# Patient Record
Sex: MALE | Race: WHITE | NOT HISPANIC OR LATINO | URBAN - NONMETROPOLITAN AREA
[De-identification: names, ages, dates, MRNs, and addresses within clinical notes are randomized per-mention and may not be internally consistent; named-entity substitution may affect disease eponyms.]

---

## 2018-02-14 ENCOUNTER — OFFICE VISIT CONVERTED (OUTPATIENT)
Dept: FAMILY MEDICINE CLINIC | Age: 38
End: 2018-02-14
Attending: NURSE PRACTITIONER

## 2018-02-17 LAB
ALBUMIN SERPL-MCNC: 4.7 G/DL
ALBUMIN/GLOB SERPL: 2 {RATIO}
ALP SERPL-CCNC: 82 IU/L
ALT SERPL-CCNC: 82 IU/L
AST SERPL-CCNC: 45 IU/L
BILIRUB SERPL-MCNC: 0.3 MG/DL
BUN SERPL-MCNC: 14 MG/DL
BUN/CREAT SERPL: 12
CALCIUM SERPL-MCNC: 9.3 MG/DL
CHLORIDE SERPL-SCNC: 104 MMOL/L
CHOLEST SERPL-MCNC: 142 MG/DL
CO2 SERPL-SCNC: 24 MMOL/L
CONV TOTAL PROTEIN: 7.1 G/DL
CREAT UR-MCNC: 1.18 MG/DL
ERYTHROCYTE [DISTWIDTH] IN BLOOD BY AUTOMATED COUNT: 13.6 %
GLOBULIN UR ELPH-MCNC: 2.4 G/DL
GLUCOSE SERPL-MCNC: 98 MG/DL
HCT VFR BLD AUTO: 42.5 %
HDLC SERPL-MCNC: 34 MG/DL
HGB BLD-MCNC: 14.6 G/DL
LDLC SERPL CALC-MCNC: 89 MG/DL
MCH RBC QN AUTO: 30.2 PG
MCHC RBC AUTO-ENTMCNC: 34.4 G/DL
MCV RBC AUTO: 88 FL
PLATELET # BLD AUTO: 285 X10E3/UL
POTASSIUM SERPL-SCNC: 4.2 MMOL/L
RBC # BLD AUTO: 4.84 X10E6/UL
SODIUM SERPL-SCNC: 145 MMOL/L
TRIGL SERPL-MCNC: 95 MG/DL
TSH SERPL-ACNC: 1.91 UIU/ML
VLDLC SERPL-MCNC: 19 MG/DL
WBC # BLD AUTO: 7.4 X10E3/UL

## 2018-11-26 ENCOUNTER — OFFICE VISIT CONVERTED (OUTPATIENT)
Dept: FAMILY MEDICINE CLINIC | Age: 38
End: 2018-11-26
Attending: NURSE PRACTITIONER

## 2018-11-30 ENCOUNTER — OFFICE VISIT CONVERTED (OUTPATIENT)
Dept: FAMILY MEDICINE CLINIC | Age: 38
End: 2018-11-30
Attending: NURSE PRACTITIONER

## 2019-01-10 ENCOUNTER — OFFICE VISIT CONVERTED (OUTPATIENT)
Dept: FAMILY MEDICINE CLINIC | Age: 39
End: 2019-01-10
Attending: FAMILY MEDICINE

## 2019-01-11 ENCOUNTER — CONVERSION ENCOUNTER (OUTPATIENT)
Dept: CARDIOLOGY | Facility: CLINIC | Age: 39
End: 2019-01-11

## 2019-01-11 ENCOUNTER — CONVERSION ENCOUNTER (OUTPATIENT)
Dept: CARDIOLOGY | Facility: CLINIC | Age: 39
End: 2019-01-11
Attending: INTERNAL MEDICINE

## 2019-02-13 ENCOUNTER — OFFICE VISIT CONVERTED (OUTPATIENT)
Dept: FAMILY MEDICINE CLINIC | Age: 39
End: 2019-02-13
Attending: FAMILY MEDICINE

## 2019-02-13 ENCOUNTER — HOSPITAL ENCOUNTER (OUTPATIENT)
Dept: OTHER | Facility: HOSPITAL | Age: 39
Discharge: HOME | End: 2019-02-13
Attending: FAMILY MEDICINE

## 2019-02-13 LAB
ALBUMIN SERPL-MCNC: 4.7 G/DL (ref 3.5–5)
ALBUMIN/GLOB SERPL: 1.7 {RATIO} (ref 1.4–2.6)
ALP SERPL-CCNC: 100 U/L (ref 53–128)
ALT SERPL-CCNC: 49 U/L (ref 10–40)
ANION GAP SERPL CALC-SCNC: 15 MMOL/L (ref 8–19)
AST SERPL-CCNC: 25 U/L (ref 15–50)
BASOPHILS # BLD MANUAL: 0.03 10*3/UL (ref 0–0.2)
BASOPHILS NFR BLD MANUAL: 0.3 % (ref 0–3)
BILIRUB SERPL-MCNC: 0.55 MG/DL (ref 0.2–1.3)
BUN SERPL-MCNC: 20 MG/DL (ref 5–25)
BUN/CREAT SERPL: 18 {RATIO} (ref 6–20)
CALCIUM SERPL-MCNC: 9.7 MG/DL (ref 8.7–10.4)
CHLORIDE SERPL-SCNC: 103 MMOL/L (ref 99–111)
CHOLEST SERPL-MCNC: 132 MG/DL (ref 107–200)
CHOLEST/HDLC SERPL: 3.8 {RATIO} (ref 3–6)
CONV CO2: 28 MMOL/L (ref 22–32)
CONV TOTAL PROTEIN: 7.5 G/DL (ref 6.3–8.2)
CREAT UR-MCNC: 1.11 MG/DL (ref 0.7–1.2)
DEPRECATED RDW RBC AUTO: 42.3 FL
EOSINOPHIL # BLD MANUAL: 0.11 10*3/UL (ref 0–0.7)
EOSINOPHIL NFR BLD MANUAL: 1.2 % (ref 0–7)
ERYTHROCYTE [DISTWIDTH] IN BLOOD BY AUTOMATED COUNT: 12.6 % (ref 11.5–14.5)
GFR SERPLBLD BASED ON 1.73 SQ M-ARVRAT: >60 ML/MIN/{1.73_M2}
GLOBULIN UR ELPH-MCNC: 2.8 G/DL (ref 2–3.5)
GLUCOSE SERPL-MCNC: 96 MG/DL (ref 70–99)
GRANS (ABSOLUTE): 6.6 10*3/UL (ref 2–8)
GRANS: 69 % (ref 30–85)
HBA1C MFR BLD: 14.8 G/DL (ref 14–18)
HCT VFR BLD AUTO: 44.2 % (ref 42–52)
HDLC SERPL-MCNC: 35 MG/DL (ref 40–60)
IMM GRANULOCYTES # BLD: 0.02 10*3/UL (ref 0–0.54)
IMM GRANULOCYTES NFR BLD: 0.2 % (ref 0–0.43)
LDLC SERPL CALC-MCNC: 82 MG/DL (ref 70–100)
LYMPHOCYTES # BLD MANUAL: 2.24 10*3/UL (ref 1–5)
LYMPHOCYTES NFR BLD MANUAL: 5.9 % (ref 3–10)
MCH RBC QN AUTO: 30.5 PG (ref 27–31)
MCHC RBC AUTO-ENTMCNC: 33.5 G/DL (ref 33–37)
MCV RBC AUTO: 91.1 FL (ref 80–96)
MONOCYTES # BLD AUTO: 0.56 10*3/UL (ref 0.2–1.2)
OSMOLALITY SERPL CALC.SUM OF ELEC: 296 MOSM/KG (ref 273–304)
PLATELET # BLD AUTO: 292 10*3/UL (ref 130–400)
PMV BLD AUTO: 10 FL (ref 7.4–10.4)
POTASSIUM SERPL-SCNC: 4.3 MMOL/L (ref 3.5–5.3)
RBC # BLD AUTO: 4.85 10*6/UL (ref 4.7–6.1)
SODIUM SERPL-SCNC: 142 MMOL/L (ref 135–147)
TRIGL SERPL-MCNC: 77 MG/DL (ref 40–150)
TSH SERPL-ACNC: 1.89 M[IU]/L (ref 0.27–4.2)
VARIANT LYMPHS NFR BLD MANUAL: 23.4 % (ref 20–45)
VLDLC SERPL-MCNC: 15 MG/DL (ref 5–37)
WBC # BLD AUTO: 9.56 10*3/UL (ref 4.8–10.8)

## 2019-11-21 ENCOUNTER — HOSPITAL ENCOUNTER (OUTPATIENT)
Dept: OTHER | Facility: HOSPITAL | Age: 39
Discharge: HOME | End: 2019-11-21
Attending: FAMILY MEDICINE

## 2019-11-21 LAB
CHOLEST SERPL-MCNC: 129 MG/DL (ref 107–200)
CHOLEST/HDLC SERPL: 3.2 {RATIO} (ref 3–6)
GLUCOSE SERPL-MCNC: 98 MG/DL (ref 70–110)
HDLC SERPL-MCNC: 40 MG/DL (ref 40–60)
LDLC SERPL CALC-MCNC: 75 MG/DL (ref 70–100)
TRIGL SERPL-MCNC: 71 MG/DL (ref 40–150)
VLDLC SERPL-MCNC: 14 MG/DL (ref 5–37)

## 2020-02-12 ENCOUNTER — OFFICE VISIT CONVERTED (OUTPATIENT)
Dept: FAMILY MEDICINE CLINIC | Age: 40
End: 2020-02-12
Attending: FAMILY MEDICINE

## 2021-02-03 ENCOUNTER — HOSPITAL ENCOUNTER (OUTPATIENT)
Dept: OTHER | Facility: HOSPITAL | Age: 41
Discharge: HOME | End: 2021-02-03
Attending: FAMILY MEDICINE

## 2021-02-04 LAB — SARS-COV-2 RNA SPEC QL NAA+PROBE: NOT DETECTED

## 2021-05-16 VITALS
DIASTOLIC BLOOD PRESSURE: 75 MMHG | HEIGHT: 74 IN | SYSTOLIC BLOOD PRESSURE: 115 MMHG | HEART RATE: 62 BPM | BODY MASS INDEX: 30.03 KG/M2 | WEIGHT: 234 LBS

## 2021-05-18 NOTE — PROGRESS NOTES
Bradley AmaroYe 1980     Office/Outpatient Visit    Visit Date: Fri, Nov 30, 2018 03:38 pm    Provider: Corina Engel N.P. (Assistant: Barbara Tenorio LPN)    Location: [facility]        Electronically signed by Corina Engel N.P. on  11/30/2018 10:06:36 PM                             SUBJECTIVE:        CC:     Mr. Amaro is a 38 year old White male.  [facility] er yesterday due to chest pain. Told to f/u at this office.          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 He went to ER where he reports a normal EKG and blood work. The ER wants him to see GI and cardiology for stress test and upper endoscopy. He request Dr. Kim and Dr. Turcios. Reports no chest pain today but has an occasional burning feeling. he has restarted prilosec prn.     ROS:     CONSTITUTIONAL:  Negative for chills and fever.      CARDIOVASCULAR:  Positive for chest pain.   Negative for palpitations.      RESPIRATORY:  Negative for recent cough and dyspnea.      GASTROINTESTINAL:  Negative for abdominal pain, nausea and vomiting.      MUSCULOSKELETAL:  Negative for myalgias.      INTEGUMENTARY:  Positive for rash.      NEUROLOGICAL:  Negative for paresthesias and weakness.      PSYCHIATRIC:  Positive for anxiety.   Negative for depression.          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          Tobacco/Alcohol/Supplements:     Last Reviewed on 11/26/2018 09:53 AM by Eloina Suero    Tobacco: He has  never smoked.          Alcohol: Frequency: Socially     Caffeine:  He admits to consuming caffeine via soda ( 2 servings per day ).          Substance Abuse History:     Last Reviewed on 11/26/2018 09:53 AM by Eloina Suero    NEGATIVE         Mental Health History:     Last Reviewed on 11/26/2018 09:53 AM by Eloina Suero        Communicable Diseases (eg STDs):     Last Reviewed on 11/26/2018 09:53 AM by Eloina Suero            Current Problems:     Last Reviewed on 11/26/2018 09:53 AM by Eloina Suero    Cellulitis     Obesity, NOS     Nonalcoholic fatty liver     Degenerative disc disease     Sciatic nerve injury     Low back pain     Hyperlipidemia     Anxiety with depression     Leg pain     Tinea pedis     Elevated SGPT (ALT) US liver fatty liver 2016         Immunizations:     Fluzone (3 + years dose) 12/7/2010     Fluzone Quadrivalent (3+ years) 10/1/2018     Influenza Virus Vaccine pf (adult) 2/14/2018         Allergies:     Last Reviewed on 11/30/2018 03:43 PM by Barbara Tenorio      No Known Drug Allergies.         Current Medications:     Last Reviewed on 11/30/2018 03:43 PM by Barbara Tenorio    Bactrim DS 160mg/800mg Tablet Take 1 tablet(s) by mouth q12h for 14 days     Diflucan 100mg Tablet 1 pill daily for 5 days     Nystatin 100,000U/1gm Cream apply 2-3 times a day     Prilosec OTC 20mg Tablets, Delayed Release 1 tab daily         OBJECTIVE:        Vitals:         Historical:     11/26/2018  BP:   117/68 mm Hg ( (right arm, , sitting, );)     11/26/2018  Wt:   248.4lbs        Current: 11/30/2018 3:42:48 PM    Ht:  6 ft, 2 in;  Wt: 248.6 lbs;  BMI: 31.9    T: 98.6 F (oral);  BP: 115/74 mm Hg (right arm, sitting);  P: 83 bpm (right arm (BP Cuff), sitting);  sCr: 1.07 mg/dL;  GFR: 108.68    O2 Sat: 97 %        Exams:     PHYSICAL EXAM:     GENERAL: Vitals recorded well developed, well nourished;  well groomed;  no apparent distress;     RESPIRATORY: normal respiratory rate and  pattern with no distress; normal breath sounds with no rales, rhonchi, wheezes or rubs;     CARDIOVASCULAR: normal rate; rhythm is regular;     MUSCULOSKELETAL: normal gait; normal overall tone     NEUROLOGIC: mental status: alert and oriented x 3;     PSYCHIATRIC:  appropriate affect and demeanor; normal speech pattern; grossly normal memory;         ASSESSMENT           Chest pain    786.51   R07.9  Chest pain              DDx:         ORDERS:         Procedures Ordered:       REFER  Referral to Specialist or Other Facility  (Send-Out)                   PLAN:          Chest pain         REFERRALS:  Referral initiated to a cardiologist ( Dr Turcios ([facility]) ) and a general surgeon ( Dr. Jose Alfredo Kim ).      does not feel as if he needs to restart zoloft.            Orders:       REFER  Referral to Specialist or Other Facility  (Send-Out)               CHARGE CAPTURE           **Please note: ICD descriptions below are intended for billing purposes only and may not represent clinical diagnoses**        Primary Diagnosis:         Chest pain    786.51 Chest pain            R07.9    Chest pain, unspecified              Orders:          [id number]   Office/outpatient visit; established patient, level 3  (In-House)

## 2021-05-18 NOTE — PROGRESS NOTES
Bradley Amaro 1980     Office/Outpatient Visit    Visit Date: Thu, Neil 10, 2019 11:29 am    Provider: Alphonse Vega MD (Assistant: Teena Lou MA)    Location: [facility]        Electronically signed by Alphonse Vega MD on  01/13/2019 01:20:40 PM                             SUBJECTIVE:        CC:     Mr. Amaro is a 38 year old White male.  He is here today following a transition of care from an inpatient hospital: The patient was admitted on [facility] on 12-23-18 and was discharged on 12-24-18.. The patient was admitted for chest pain, but ended up having his galbladder removed.. Our office called the patient within 48 hours of discharge and scheduled the follow-up appointment. During the patient's hospital stay the patient was treated by Dr. Kim.. We attempted to contact the patient several times to discuss condition.  Transition of care appointment was scheduled by hospital within 48 hours of discharge..  Patient complains of ongoing chest pain that radiates into both arms. Along with intermittent numbness/tingling in both hands and feet.          HPI:     Chest pain started 3rd week of November, went to [facility] ER 11/29 and had cardiac eval with EKG and blood work and this was reassuring. Couple weeks later pt went to [facility] by ambulance and was also released. Pt had EGD with Dr. Kim and this was normal. Hida scan showed 17% function of gall bladder. December 23rd pt went to ER for abdominal and chest pain and gal bladder was removed and this helped abdominal pain but not chest or arm pain.     - Chest pain always there a little, some times worse than others. Central, radiates either right or left. Pressure-like, sharp, burning. Maybe a little palpitations, no shortness of breath, no cold sweats. None in the morning. He's been on prilosec for years. Arm pain, more in biceps and forearm, is sharp and achy. Hands will tingle some.     - Pt has a cardiology on 1/22 with  Dr. Menchaca.     - Anxiety - Has h/o anxiety issues, 10 years ago took zoloft off and on for a few years and this helped it.     Anxiety - Has h/o anxiety issues, 10 years ago took zoloft off and on for a few years and this helped it.     ROS:     CONSTITUTIONAL:  Negative for fatigue and fever.      EYES:  Negative for blurred vision.      E/N/T:  Negative for diminished hearing and nasal congestion.      CARDIOVASCULAR:  Positive for chest pain ( unrelated to exertion ).   Negative for palpitations.      RESPIRATORY:  Negative for recent cough and dyspnea.      GASTROINTESTINAL:  Negative for abdominal pain, constipation, diarrhea, nausea and vomiting.      MUSCULOSKELETAL:  Negative for arthralgias and myalgias.      NEUROLOGICAL:  Negative for paresthesias and weakness.      PSYCHIATRIC:  Positive for anxiety and depression.   Negative for sleep disturbance.          PMH/FMH/SH:     Last Reviewed on 1/10/2019 11:29 AM by Teena Lou    Past Medical History:         Hospitalizations: depression         Surgical History:         Appendectomy      Tonsillectomy      Cholecystectomy: laparoscopic; 12/23/2018;      back cyst removal;     Procedures: EGD 12-14-18 normal         Family History:     Father: Hyperlipidemia     Mother: Healthy         Social History:     Occupation: a business owner in [employer]     Marital Status:      Children: 4 children     Exercise: Primary form of exercise is hunting.          Tobacco/Alcohol/Supplements:     Last Reviewed on 1/10/2019 11:29 AM by eTena Lou    Tobacco: He has never smoked.          Alcohol: Frequency: Socially     Caffeine:  He admits to consuming caffeine via soda ( 2 servings per day ).          Substance Abuse History:     Last Reviewed on 1/10/2019 11:29 AM by Teena Lou    NEGATIVE         Mental Health History:     Last Reviewed on 1/10/2019 11:29 AM by Teena Lou        Communicable Diseases (eg STDs):      Last Reviewed on 1/10/2019 11:29 AM by Teena Lou            Current Problems:     Last Reviewed on 1/10/2019 11:29 AM by Teena Lou    Cellulitis     Obesity, NOS     Nonalcoholic fatty liver     Degenerative disc disease     Sciatic nerve injury     Low back pain     Hyperlipidemia     Anxiety with depression     Chest pain     Leg pain     Tinea pedis     Elevated SGPT (ALT) US liver fatty liver 2016         Immunizations:     Fluzone (3 + years dose) 12/7/2010     Fluzone Quadrivalent (3+ years) 10/1/2018     Influenza Virus Vaccine pf (adult) 2/14/2018         Allergies:     Last Reviewed on 1/10/2019 11:29 AM by Teena Lou      No Known Drug Allergies.         Current Medications:     Last Reviewed on 1/10/2019 11:29 AM by Teena Lou    Prilosec OTC 20mg Tablets, Delayed Release 1 tab daily         OBJECTIVE:        Vitals:         Current: 1/10/2019 11:40:34 AM    Ht:  6 ft, 2 in;  Wt: 237.6 lbs;  BMI: 30.5    T: 99.1 F (oral);  BP: 115/79 mm Hg (right arm, sitting);  P: 78 bpm (right arm (BP Cuff), sitting);  sCr: 1.07 mg/dL;  GFR: 106.61        Exams:     PHYSICAL EXAM:     GENERAL: Vitals recorded well developed, well nourished;  well groomed;  no apparent distress;     EYES: extraocular movements intact; conjunctiva and cornea are normal; PERRLA;     E/N/T: OROPHARYNX:  normal mucosa, dentition, gingiva, and posterior pharynx;     NECK: range of motion is normal; thyroid exam reveals not enlarged;     RESPIRATORY: normal respiratory rate and pattern with no distress; normal breath sounds with no rales, rhonchi, wheezes or rubs;     CARDIOVASCULAR: normal rate; rhythm is regular;  no systolic murmur; no edema;     GASTROINTESTINAL: nontender; normal bowel sounds;     MUSCULOSKELETAL: normal gait; normal overall tone     NEUROLOGIC: mental status: alert and oriented x 3;     PSYCHIATRIC:  appropriate affect and demeanor; normal speech pattern; grossly normal memory;          ASSESSMENT           786.59   R07.89  Chest pain, other type              DDx:     300.4   F34.1  Anxiety with depression              DDx:         ORDERS:         Meds Prescribed:       Sertraline HCl 50mg Tablet 1/2 tab for 2 weeks, then 1 tab qd thereafter.  #30 (Thirty) tablet(s) Refills: 2         Radiology/Test Orders:       [id number]  CV strs tst xerc&/or rx cont ECG w/SI&R  (Send-Out)                   PLAN:          Chest pain, other type Several w/u in ER have been reassuring but given chronic, reocurring episodes of chest pain with several ER visits, will attempt to r/o more definitively with treadmill stress test.         TESTS/PROCEDURES:  Will proceed with Plain Stress Test to be scheduled to be performed/scheduled now.            Orders:       [id number]  CV strs tst xerc&/or rx cont ECG w/SI&R  (Send-Out)            Anxiety with depression We discussed that if CP is not cardiac then anxiety, GERD, costochondritis are possible alternatives. Anxiety seems most likely given his Hx.           Prescriptions:       Sertraline HCl 50mg Tablet 1/2 tab for 2 weeks, then 1 tab qd thereafter.  #30 (Thirty) tablet(s) Refills: 2             CHARGE CAPTURE           **Please note: ICD descriptions below are intended for billing purposes only and may not represent clinical diagnoses**        Primary Diagnosis:         786.59 Chest pain, other type            R07.89    Other chest pain              Orders:          [id number]   Office/outpatient visit; established patient, level 4  (In-House)           300.4 Anxiety with depression            F34.1    Dysthymic disorder

## 2021-05-18 NOTE — PROGRESS NOTES
Bradley Amaro 1980     Office/Outpatient Visit    Visit Date: Wed, Feb 13, 2019 10:07 am    Provider: Alphonse Vega MD (Assistant: Teena Lou MA)    Location: [facility]        Electronically signed by Alphonse Vega MD on  02/13/2019 05:39:39 PM                             SUBJECTIVE:        CC:     Mr. Amaro is a 38 year old White male.  Patient is here for yearly physical exam.          HPI:         Health checkup noted.          PHQ-9 Depression Screening: Completed form scanned and in chart; Total Score 1 Alcohol Consumption Screening: Completed form scanned and in chart; Total Score 0     Pt has chronic chest pain, this has been worked up rather extensively in ER (3x in the month of Nov/Dec- Thanksgiving to [place]). He had a stress test that was normal on 1/11/19. He saw Dr. Menchaca 1/22 and felt maybe SVT was the cause and pt was started on metoprolol 12.5 mg qd. He is also on prilosec and sertraline. He also saw Dr. Zapata with [facility] and metoprolol was increased to 25 mg qd. He ordered an ECHO and a CT Angio chest which came back normal. He has a f/u with B[facility] in 3 months but might go back sooner.     Pt was started on sertraline about a month ago and this has helped a fair amount.     ROS:     CONSTITUTIONAL:  Negative for fatigue and fever.      EYES:  Negative for blurred vision.      E/N/T:  Negative for diminished hearing and nasal congestion.      CARDIOVASCULAR:  Positive for chest pain.   Negative for orthopnea, palpitations, pedal edema or tachycardia.      RESPIRATORY:  Negative for recent cough and dyspnea.      GASTROINTESTINAL:  Negative for abdominal pain, constipation, diarrhea, nausea and vomiting.      MUSCULOSKELETAL:  Negative for arthralgias and myalgias.      NEUROLOGICAL:  Negative for paresthesias and weakness.      PSYCHIATRIC:  Positive for anxiety and depression.   Negative for sleep disturbance.          PMH/FMH/SH:     Last Reviewed on 2/13/2019  05:34 PM by Alphonse Vega    Past Medical History: stress test negative for ischemia, but SVT and chest pain noted.  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          Tobacco/Alcohol/Supplements:     Last Reviewed on 2/13/2019 05:34 PM by Alphonse Vega    Tobacco: He has never smoked.          Alcohol: Frequency: Socially     Caffeine:  He admits to consuming caffeine via soda ( 2 servings per day ).          Substance Abuse History:     Last Reviewed on 2/13/2019 05:34 PM by Alphonse Vega    NEGATIVE         Mental Health History:     Last Reviewed on 2/13/2019 05:34 PM by Alphonse Vega        Communicable Diseases (eg STDs):     Last Reviewed on 2/13/2019 05:34 PM by Alphonse Vega            Current Problems:     Last Reviewed on 2/13/2019 05:34 PM by Alphonse Vega    Cellulitis     Obesity, NOS     Nonalcoholic fatty liver     Degenerative disc disease     Sciatic nerve injury     Low back pain     Hyperlipidemia     Anxiety with depression     Chest pain, other type     Screening for depression     Elevated SGPT (ALT) US liver fatty liver 2016         Immunizations:     Fluzone (3 + years dose) 12/7/2010     Fluzone Quadrivalent (3+ years) 10/1/2018     Influenza Virus Vaccine pf (adult) 2/14/2018         Allergies:     Last Reviewed on 2/13/2019 05:34 PM by Alphonse Vega      No Known Drug Allergies.         Current Medications:     Last Reviewed on 2/13/2019 05:34 PM by Alphonse Vega    Prilosec OTC 20mg Tablets,  Delayed Release 1 tab daily     Sertraline HCl 50mg Tablet one a day     Metoprolol Succinate 25mg Tablets, Extended Release 1 tablet BID         OBJECTIVE:        Vitals:         Current: 2/13/2019 10:11:17 AM    Ht:  6 ft, 2 in;  Wt: 229.2 lbs;  BMI: 29.4    T: 98.8 F (oral);  BP: 100/64 mm Hg (left arm, sitting);  P: 67 bpm (left arm (BP Cuff), sitting);  sCr: 1.07 mg/dL;  GFR: 104.99        Exams:     PHYSICAL EXAM:     GENERAL: Vitals recorded well developed, well nourished;  well groomed;  no apparent distress;     EYES: extraocular movements intact; conjunctiva and cornea are normal; PERRLA;     E/N/T: OROPHARYNX:  normal mucosa, dentition, gingiva, and posterior pharynx;     RESPIRATORY: normal respiratory rate and pattern with no distress; normal breath sounds with no rales, rhonchi, wheezes or rubs;     CARDIOVASCULAR: normal rate; rhythm is regular;  no systolic murmur; no edema; No chest wall TTP;     GASTROINTESTINAL: nontender; normal bowel sounds;     MUSCULOSKELETAL: normal gait; normal overall tone     NEUROLOGIC: mental status: alert and oriented x 3;     PSYCHIATRIC:  appropriate affect and demeanor; normal speech pattern; grossly normal memory;         ASSESSMENT           V70.0   Z00.00  Health checkup              DDx:     V79.0   Z13.89  Screening for depression              DDx:     786.59   R07.89  Chest pain, other type              DDx:     300.4   F34.1  Anxiety with depression              DDx:         ORDERS:         Meds Prescribed:       Refill of: Sertraline HCl 50mg Tablet one a day  #30 (Thirty) tablet(s) Refills: 2       Buspirone HCl 10mg Tablet 1 tab bid PRN  #60 (Sixty) tablet(s) Refills: 0         Lab Orders:       [id number]  [facility] PHYSICAL: CMP, CBC, TSH, LIPID: [id number], [id number], [id number], [id number]  (Send-Out)           Other Orders:         Depression screen negative  (In-House)           Negative EtOH screen  (In-House)           Calculated BMI above the upper  parameter and a follow-up plan was documented in the medical record  (In-House)                   PLAN:          Health checkup Overall patient appears to be in good health. Labs ordered for annual physical. Chest pain does not appear cardiac in nature as discussed below.          Screening for depression     MIPS PHQ-9 Depression Screening Completed form scanned and in chart; Total Score 1 Negative alcohol screen     BMI Elevated - Follow-Up Plan: He was provided education on weight loss strategies           Orders:         Depression screen negative  (In-House)           Negative EtOH screen  (In-House)           Calculated BMI above the upper parameter and a follow-up plan was documented in the medical record  (In-House)            Chest pain, other type Pt is fasting, will get annual labs. Chest pain does not appear cardiac in nature due to presentation of chest pain and extensive w/u that has been reassuring. Likely related to anxiety or chostochondritis. Pt started sertraline somewhat recently so will wait 1 month to increase dose. He is also counseled to try NSAIDs for chest wall pain.     LABORATORY:  Labs ordered to be performed today include PHYSICAL PANEL; CMP, CBC, TSH, LIPID.            Orders:       [id number]  [facility] PHYSICAL: CMP, CBC, TSH, LIPID: [id number], [id number], [id number], [id number]  (Send-Out)            Anxiety with depression Increase sertraline to 100 mg in 1 month. Buspar started today.           Prescriptions:       Refill of: Sertraline HCl 50mg Tablet one a day  #30 (Thirty) tablet(s) Refills: 2       Buspirone HCl 10mg Tablet 1 tab bid PRN  #60 (Sixty) tablet(s) Refills: 0             CHARGE CAPTURE           **Please note: ICD descriptions below are intended for billing purposes only and may not represent clinical diagnoses**        Primary Diagnosis:         V70.0 Health checkup            Z00.00    Encounter for general adult medical examination without abnormal findings               Orders:          [id number]   Preventive medicine, established patient, age 18-39 years  (In-House)           V79.0 Screening for depression            Z13.89    Encounter for screening for other disorder              Orders:          [id number]  Office/outpatient visit; established patient, level 2  (In-House)                Depression screen negative  (In-House)                Negative EtOH screen  (In-House)                Calculated BMI above the upper parameter and a follow-up plan was documented in the medical record  (In-House)           786.59 Chest pain, other type            R07.89    Other chest pain    300.4 Anxiety with depression            F34.1    Dysthymic disorder

## 2021-05-18 NOTE — PROGRESS NOTES
Bradley Amaro 1980     Office/Outpatient Visit    Visit Date: Wed, Feb 14, 2018 03:58 pm    Provider: Corina Engel N.P. (Assistant: Teena Lou MA)    Location: [facility]        Electronically signed by Corina Engel N.P. on  02/15/2018 11:47:00 AM                             SUBJECTIVE:        CC:     Mr. Amaro is a 37 year old White male.  Patient is here for yearly PE.          HPI:         Mr. Amaro is here for routine periodic health screening and examination.  His last physical exam was 1 year ago.  A hearing test was done <1 year ago and results were normal.   He's had vision screening done <6 months ago and this was normal.  He is current with his Td and influenza immunization.      ROS:     CONSTITUTIONAL:  Negative for chills, fatigue, fever, and weight change.      EYES:  Negative for blurred vision, eye pain, and photophobia.      E/N/T:  Negative for hearing problems, E/N/T pain, congestion, rhinorrhea, epistaxis, hoarseness, and dental problems.      CARDIOVASCULAR:  Negative for chest pain, palpitations, tachycardia, orthopnea, and edema.      RESPIRATORY:  Negative for cough, dyspnea, and hemoptysis.      GASTROINTESTINAL:  Negative for abdominal pain, heartburn, constipation, diarrhea, and stool changes.      GENITOURINARY:  Negative for dysuria, genital lesions, hematuria, impotence, polyuria, and changes in urine stream.      MUSCULOSKELETAL:  Positive for back pain ( recurrent ).      INTEGUMENTARY:  Negative for rash.      NEUROLOGICAL:  Negative for dizziness, headaches, paresthesias, and weakness.      HEMATOLOGIC/LYMPHATIC:  Negative for easy bruising, bleeding, and lymphadenopathy.      ENDOCRINE:  Negative for hair loss, heat/cold intolerance, polydipsia, and polyphagia.      ALLERGIC/IMMUNOLOGIC:  Negative for seasonal allergies.      PSYCHIATRIC:  Negative for anxiety, depression, and sleep disturbances.          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          Tobacco/Alcohol/Supplements:     Last Reviewed on 2/14/2018 04:03 PM by Teena Lou    Tobacco: He has never smoked.          Alcohol: Frequency: Socially     Caffeine:  He admits to consuming caffeine via soda ( 2 servings per day ).          Substance Abuse History:     Last Reviewed on 1/24/2017 03:38 PM by Jerry Galindo    NEGATIVE         Mental Health History:     Last Reviewed on 1/24/2017 03:38 PM by Jerry Galindo        Communicable Diseases (eg STDs):     Last Reviewed on 1/24/2017 03:38 PM by Jerry Galindo            Current Problems:     Last Reviewed on 1/24/2017 03:38 PM by Jerry Galindo    Nonalcoholic fatty liver     Degenerative disc disease     Sciatic nerve injury     Low back pain     Overweight     Hyperlipidemia     Anxiety with depression     Fatigue     Elevated SGPT (ALT) US liver fatty liver 2016         Immunizations:     Fluzone (3 + years dose) 12/7/2010     Influenza Virus Vaccine pf (adult) 2/14/2018         Allergies:     Last Reviewed on 3/15/2017 12:38 PM by Jessy Wood      No Known Drug Allergies.         Current Medications:     Last Reviewed on 2/14/2018 03:58 PM by Teena Lou    Prilosec OTC 20mg Tablets, Delayed Release 1 tab daily         OBJECTIVE:        Vitals:         Historical:     03/15/2017  BP:   107/74 mm Hg ( (right arm, , sitting, );)     03/15/2017  Wt:   251.8lbs        Current: 2/14/2018 4:01:15 PM    Ht:  6 ft, 2 in;  Wt: 255.3 lbs;  BMI: 32.8    T: 98.6 F (oral);  BP:  104/68 mm Hg (right arm, sitting);  P: 84 bpm (right arm (BP Cuff), sitting);  sCr: 0.99 mg/dL;  GFR: 119.92        Exams:     PHYSICAL EXAM:     GENERAL:  well developed and nourished; appropriately groomed; in no apparent distress;     EYES: PERRL, EOMI     E/N/T: EARS: bilateral TMs are normal;  NOSE: normal nasal mucosa; OROPHARYNX: posterior pharynx, including tonsils, tongue, and uvula are normal;     NECK: thyroid is non-palpable;     RESPIRATORY: normal respiratory rate and pattern with no distress; normal breath sounds with no rales, rhonchi, wheezes or rubs;     CARDIOVASCULAR: normal rate; rhythm is regular;  no edema;     GASTROINTESTINAL: nontender; normal bowel sounds;     LYMPHATIC: no enlargement of cervical or facial nodes;     MUSCULOSKELETAL:  Normal range of motion, strength and tone;     NEUROLOGIC: mental status: alert and oriented x 3; GROSSLY INTACT     PSYCHIATRIC:  appropriate affect and demeanor; normal speech pattern; grossly normal memory;         Procedures:     Vaccination against other viral diseases, Influenza     1. Influenza, seasonal PF (children 3 years to adult): 0.5 ml unit dose given IM in the left upper arm; administered by mlb;  lot number [id number]; expires June 30, 2018 Regarding contraindications to an Influenza vaccine: Denies moderate/severe illness with/without fever; serious reaction to eggs, egg proteins, gentamicin, gelatin, arginine, neomycin or polymixin; serious reaction after recieving previous influenza vaccines; and history of Guillain-[place] Syndrome.              ASSESSMENT           V04.81   Z23  Vaccination against other viral diseases, Influenza              DDx:     V70.0   Z00.00  Annual physical              DDx:         ORDERS:         Lab Orders:       [id number]  [id number] [facility] CBC without diff  (Send-Out)         [id number] [facility] Comp Metabolic Panel (14)  (Send-Out)         63502  [id number] [facility] Lipid Panel (Excludes LDL/HDL ratio)  (Send-Out)          25613  [id number] [facility]  ([facility])           Procedures Ordered:       [id number]  Immunization administration; one vaccine  (In-House)           Other Orders:       28571  Influenza virus vaccine, quadrivalent, split virus, preservative free 3 years of age & older  (In-House)                   PLAN:          Vaccination against other viral diseases, Influenza           Orders:       95539  Immunization administration; one vaccine  (In-House)         06875  Influenza virus vaccine, quadrivalent, split virus, preservative free 3 years of age & older  (In-House)            Annual physical     LABORATORY:  Labs ordered to be performed today at [facility] include CBC W/O DIFF.  CMP Lipid panel TSH     COUNSELING was provided today regarding the following topics: healthy eating habits, low cholesterol diet, low salt diet, regular exercise, and testicular self-exam.            Orders:       [id number]  [id number] [facility]  ([facility])         68335  [id number] [facility] Comp [facility] Panel (14)  (Send-Out)         33299  [id number] [facility] Lipid Panel (Excludes LDL/HDL ratio)  (Send-Out)         69879  [id number] [facility] TSH  (Send-Out)             Patient Education Handouts:       Physical Exam 30-39 year, Male              Patient Recommendations:        For  Annual physical:     Limit dietary intake of fat (especially saturated fat) and cholesterol.  Eat a variety of foods, including plenty of fruits, vegetables, and grain containg fiber, limit fat intake to 30% of total calories. Balance caloric intake with energy expended. Maintaining regular physical activity is advised to help prevent heart disease, hypertension, diabetes, and obesity.    Testicular cancer is the #1 cancer in men ages 15-35.  You should regularly examine your testicles for knots, lumps, or tenderness. Testicular pain, even if not associated with any masses, needs to be evaluated by your doctor!              CHARGE CAPTURE           **Please note: ICD  descriptions below are intended for billing purposes only and may not represent clinical diagnoses**        Primary Diagnosis:         V04.81 Vaccination against other viral diseases, Influenza            Z23    Encounter for immunization              Orders:          [id number]   Immunization administration; one vaccine  (In-House)             07297   Influenza virus vaccine, quadrivalent, split virus, preservative free 3 years of age & older  (In-House)           V70.0 Annual physical            Z00.00    Encounter for general adult medical examination without abnormal findings              Orders:          27681   Preventive medicine, established patient, age 18-39 years  (In-House)

## 2021-05-18 NOTE — PROGRESS NOTES
Bradley AmaroYe 1980     Office/Outpatient Visit    Visit Date: Mon, Nov 26, 2018 09:37 am    Provider: Eloina Suero N.P. (Assistant: Kelli Jaime MA)    Location: [facility]        Electronically signed by Eloina Suero N.P. on  11/26/2018 02:02:11 PM                             SUBJECTIVE:        CC:     Mr. Amaro is a 38 year old White male.  presents today due to complaints of circulation issues in bilateral feet X 1 month         HPI:         Patient presents with anxiety with depression.  States doing well without medication. However with his foot issues, he has had increased anxiety.          Tinea pedis: States having a fungal infection intermittently. States 3 weeks ago going to [facility] clinic. She gave him 2 different creams. States it dried it up and then had to use aquaphor as a moisturizer. States then getting red and cracked with drainage. Pt states feeling like he has circulation issues. States feeling better when he props up his feet. Redness will get a little better but not resolve. He wears boots for work and dealing with foot issues for several months.  Pt states wanting to ensure he doesn't have any circulation issue going on.. His mom is worried about diabetes. His grandfather was DM and she saw his feet.     ROS:     CONSTITUTIONAL:  Negative for chills, fatigue, fever and weight change.      CARDIOVASCULAR:  Negative for chest pain, orthopnea, paroxysmal nocturnal dyspnea and pedal edema.      RESPIRATORY:  Negative for dyspnea and cough.      GASTROINTESTINAL:  Negative for abdominal pain, heartburn, constipation, diarrhea, and stool changes.      MUSCULOSKELETAL:  Negative for arthralgias, back pain, and myalgias.      NEUROLOGICAL:  Negative for dizziness, headaches, paresthesias, and weakness.      PSYCHIATRIC:  Negative for anxiety and depression.          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          Tobacco/Alcohol/Supplements:     Last Reviewed on 11/26/2018 09:53 AM by Eloina Suero    Tobacco: He has never smoked.          Alcohol: Frequency: Socially     Caffeine:  He admits to consuming caffeine via soda ( 2 servings per day ).          Substance Abuse History:     Last Reviewed on 11/26/2018 09:53 AM by Eloina Suero    NEGATIVE         Mental Health History:     Last Reviewed on 11/26/2018 09:53 AM by Eloina Suero        Communicable Diseases (eg STDs):     Last Reviewed on 11/26/2018 09:53 AM by Eloina Suero            Current Problems:     Last Reviewed on 11/26/2018 09:53 AM by Eloina Suero    Obesity, NOS     Nonalcoholic fatty liver     Degenerative disc disease     Sciatic nerve injury     Low back pain     Hyperlipidemia     Anxiety with depression     Elevated SGPT (ALT) US liver fatty liver 2016         Immunizations:     Fluzone (3 + years dose) 12/7/2010     Fluzone Quadrivalent (3+ years) 10/1/2018     Influenza Virus Vaccine pf (adult) 2/14/2018         Allergies:     Last Reviewed on 11/26/2018 09:53 AM by Eloina Suero      No Known Drug Allergies.         Current Medications:     Last Reviewed on 11/26/2018 09:53 AM by Eloina Suero    Prilosec OTC 20mg Tablets, Delayed Release 1 tab daily         OBJECTIVE:        Vitals:         Current: 11/26/2018 9:42:32 AM    Ht:  6 ft, 2 in;  Wt: 248.4 lbs;  BMI: 31.9    BP: 117/68 mm Hg (right arm, sitting);  P: 89 bpm (right arm (BP Cuff), sitting);  sCr: 1.18 mg/dL;  GFR: 98.52        Exams:     PHYSICAL EXAM:      GENERAL: Vitals recorded well developed, well nourished;  well groomed;  no apparent distress;     EYES: lids and lacrimal system are normal in appearance; extraocular movements intact; conjunctiva and cornea are normal; PERRLA;     NECK:  supple, full ROM; no thyromegaly; no carotid bruits;     RESPIRATORY: normal respiratory rate and pattern with no distress; normal breath sounds with no rales, rhonchi, wheezes or rubs;     CARDIOVASCULAR: normal rate; rhythm is regular;  normal S1; normal S2; no systolic murmur; no cyanosis; no edema;     GASTROINTESTINAL: nontender, nondistended; no hepatosplenomegaly or masses; no bruits;     SKIN: Dry skin, cracked, erythematous bilateral feet. Cap refill less than 1 sec, DP and PT pulses palpable and strong bilaterally.;     MUSCULOSKELETAL:  Normal range of motion, strength and tone;     NEUROLOGICAL:  cranial nerves, motor and sensory function, reflexes, gait and coordination are all intact;     PSYCHIATRIC:  appropriate affect and demeanor; normal speech pattern; grossly normal memory;         ASSESSMENT:           300.4   F34.1  Anxiety with depression              DDx:     278.00   E66.9   Z71.3  Obesity, NOS              DDx:     110.4   B35.3  Tinea pedis              DDx:     682.9   L03.90  Cellulitis              DDx:     729.5   M79.662   M79.604  Leg pain              DDx:         ORDERS:         Meds Prescribed:       Bactrim DS (Trimethoprim/Sulfamethoxazole ) Tablet Take 1 tablet(s) by mouth q12h for 14 days  #28 (Twenty Eight) tablet(s) Refills: 0       Nystatin 100,000U/1gm Cream apply 2-3 times a day  #30 (Thirty) gm Refills: 0       Diflucan (Fluconazole) 100mg Tablet 1 pill daily for 5 days  #5 (Five) tablet(s) Refills: 0         Lab Orders:       [id number]  BDCBC - [facility] CBC with 3 part diff  (Send-Out)         94649  COMP - [facility] Comp. Metabolic Panel  (Send-Out)         43678  TSH - [facility] TSH  (Send-Out)           Procedures Ordered:       [id number]  Ankle brachial  index  (Send-Out; Stat)           Other Orders:         Calculated BMI above the upper parameter and a follow-up plan was documented in the medical record  (In-House)                   PLAN:          Obesity, NOS     MIPS     BMI Elevated - Follow-Up Plan: He was provided education on weight loss strategies           Orders:         Calculated BMI above the upper parameter and a follow-up plan was documented in the medical record  (In-House)            Tinea pedis           Prescriptions:       Nystatin 100,000U/1gm Cream apply 2-3 times a day  #30 (Thirty) gm Refills: 0       Diflucan (Fluconazole) 100mg Tablet 1 pill daily for 5 days  #5 (Five) tablet(s) Refills: 0          Cellulitis     LABORATORY:  Labs ordered to be performed today include CBC, Comprehensive metabolic panel, and TSH.            Prescriptions:       Bactrim DS (Trimethoprim/Sulfamethoxazole ) Tablet Take 1 tablet(s) by mouth q12h for 14 days  #28 (Twenty Eight) tablet(s) Refills: 0           Orders:       [id number]  BDCBC - [facility] CBC with 3 part diff  (Send-Out)         90645  COMP - [facility] Comp. Metabolic Panel  (Send-Out)         29678  TSH - [facility] TSH  (Send-Out)            Leg pain         FOLLOW-UP TESTING #1:    FOLLOW-UP TESTS/PROCEDURES:  Will schedule BERYL Testing.            Orders:       60900  Ankle brachial index  (Send-Out; Stat)               CHARGE CAPTURE:           Primary Diagnosis:     300.4 Anxiety with depression            F34.1    Dysthymic disorder              Orders:          21973   Office/outpatient visit; established patient, level 4  (In-House)           278.00 Obesity, NOS            E66.9    Obesity, unspecified           Z71.3    Dietary counseling and surveillance              Orders:             Calculated BMI above the upper parameter and a follow-up plan was documented in the medical record  (In-House)           110.4 Tinea pedis            B35.3    Tinea pedis    682.9 Cellulitis            L03.90     Cellulitis, unspecified    729.5 Leg pain            M79.662    Pain in left lower leg           M79.604    Pain in right leg

## 2021-07-01 VITALS
SYSTOLIC BLOOD PRESSURE: 117 MMHG | HEART RATE: 89 BPM | DIASTOLIC BLOOD PRESSURE: 68 MMHG | HEIGHT: 74 IN | BODY MASS INDEX: 31.88 KG/M2 | WEIGHT: 248.4 LBS

## 2021-07-01 VITALS
BODY MASS INDEX: 30.49 KG/M2 | DIASTOLIC BLOOD PRESSURE: 79 MMHG | TEMPERATURE: 99.1 F | SYSTOLIC BLOOD PRESSURE: 115 MMHG | WEIGHT: 237.6 LBS | HEIGHT: 74 IN | HEART RATE: 78 BPM

## 2021-07-01 VITALS
DIASTOLIC BLOOD PRESSURE: 74 MMHG | HEART RATE: 83 BPM | WEIGHT: 248.6 LBS | TEMPERATURE: 98.6 F | BODY MASS INDEX: 31.9 KG/M2 | OXYGEN SATURATION: 97 % | HEIGHT: 74 IN | SYSTOLIC BLOOD PRESSURE: 115 MMHG

## 2021-07-01 VITALS
WEIGHT: 255.3 LBS | DIASTOLIC BLOOD PRESSURE: 68 MMHG | BODY MASS INDEX: 32.76 KG/M2 | HEART RATE: 84 BPM | TEMPERATURE: 98.6 F | HEIGHT: 74 IN | SYSTOLIC BLOOD PRESSURE: 104 MMHG

## 2021-07-01 VITALS
HEIGHT: 74 IN | TEMPERATURE: 98.8 F | SYSTOLIC BLOOD PRESSURE: 100 MMHG | WEIGHT: 229.2 LBS | DIASTOLIC BLOOD PRESSURE: 64 MMHG | HEART RATE: 67 BPM | BODY MASS INDEX: 29.41 KG/M2

## 2021-07-02 VITALS
SYSTOLIC BLOOD PRESSURE: 113 MMHG | TEMPERATURE: 98.8 F | HEART RATE: 84 BPM | HEIGHT: 74 IN | BODY MASS INDEX: 34.68 KG/M2 | WEIGHT: 270.2 LBS | DIASTOLIC BLOOD PRESSURE: 71 MMHG

## 2021-12-14 ENCOUNTER — OFFICE VISIT (OUTPATIENT)
Dept: FAMILY MEDICINE CLINIC | Age: 41
End: 2021-12-14

## 2021-12-14 VITALS
WEIGHT: 274.4 LBS | BODY MASS INDEX: 35.22 KG/M2 | SYSTOLIC BLOOD PRESSURE: 114 MMHG | HEART RATE: 69 BPM | OXYGEN SATURATION: 96 % | DIASTOLIC BLOOD PRESSURE: 75 MMHG | HEIGHT: 74 IN

## 2021-12-14 DIAGNOSIS — F41.9 ANXIETY: Primary | ICD-10-CM

## 2021-12-14 DIAGNOSIS — Z13.1 DIABETES MELLITUS SCREENING: ICD-10-CM

## 2021-12-14 DIAGNOSIS — Z13.220 SCREENING CHOLESTEROL LEVEL: ICD-10-CM

## 2021-12-14 PROBLEM — I47.10 SVT (SUPRAVENTRICULAR TACHYCARDIA): Status: ACTIVE | Noted: 2019-01-21

## 2021-12-14 PROBLEM — IMO0002 DEGENERATION OF INTERVERTEBRAL DISC: Status: ACTIVE | Noted: 2021-12-14

## 2021-12-14 PROBLEM — I47.1 SVT (SUPRAVENTRICULAR TACHYCARDIA) (HCC): Status: ACTIVE | Noted: 2019-01-21

## 2021-12-14 PROBLEM — F32.A DEPRESSION: Status: ACTIVE | Noted: 2021-12-14

## 2021-12-14 PROBLEM — E78.5 HYPERLIPIDEMIA: Status: ACTIVE | Noted: 2021-12-14

## 2021-12-14 PROCEDURE — 99213 OFFICE O/P EST LOW 20 MIN: CPT | Performed by: NURSE PRACTITIONER

## 2021-12-14 RX ORDER — SERTRALINE HYDROCHLORIDE 100 MG/1
100 TABLET, FILM COATED ORAL DAILY
Qty: 90 TABLET | Refills: 3 | Status: SHIPPED | OUTPATIENT
Start: 2021-12-14 | End: 2022-12-29

## 2021-12-14 RX ORDER — METOPROLOL SUCCINATE 25 MG/1
1 TABLET, EXTENDED RELEASE ORAL DAILY
COMMUNITY
Start: 2021-12-01

## 2021-12-14 RX ORDER — ISOSORBIDE MONONITRATE 30 MG/1
0.5 TABLET, EXTENDED RELEASE ORAL DAILY
COMMUNITY
Start: 2021-12-01

## 2021-12-14 RX ORDER — OMEPRAZOLE 20 MG/1
TABLET, DELAYED RELEASE ORAL
COMMUNITY

## 2021-12-14 NOTE — PROGRESS NOTES
"Chief Complaint  Establish Care (Last PCP Dr. eVga )    Subjective  Patient is a 41-year-old male here today to discuss restarting sertraline for anxiety.  He is doing well on sertraline in the past and stopped taking it about 1 year ago due to feeling improvement of symptoms.  He routinely sees cardiology for tachycardia and enlargement of aortic root.  For those conditions he takes metoprolol 25 mg once per day and isosorbide 30 mg 1/2 tablet daily.  Acid reflux is stable on omeprazole 20 mg/day.  He has previously had a normal endoscopy.  Works 2 jobs and has increased occupational stress.        Bradley Amaro presents to [facility]    Review of Systems   Constitutional: Negative.    Respiratory: Negative.    Cardiovascular: Negative.    Musculoskeletal: Negative.    Neurological: Negative.         Objective   Vital Signs:   Vitals:    12/14/21 0938   BP: 114/75   BP Location: Right arm   Patient Position: Sitting   Cuff Size: Large Adult   Pulse: 69   SpO2: 96%   Weight: 124 kg (274 lb 6.4 oz)   Height: 188 cm (74\")      Physical Exam  Vitals reviewed.   Constitutional:       General: He is not in acute distress.     Appearance: Normal appearance. He is well-developed.   Neck:      Thyroid: No thyroid mass, thyromegaly or thyroid tenderness.   Cardiovascular:      Rate and Rhythm: Normal rate and regular rhythm.      Pulses:           Posterior tibial pulses are 2+ on the right side and 2+ on the left side.      Heart sounds: Normal heart sounds.   Pulmonary:      Effort: Pulmonary effort is normal.      Breath sounds: Normal breath sounds.   Musculoskeletal:      Right lower leg: No edema.      Left lower leg: No edema.   Skin:     General: Skin is warm and dry.   Neurological:      General: No focal deficit present.      Mental Status: He is alert.   Psychiatric:         Attention and Perception: Attention normal.         Mood and Affect: Mood and affect normal.         " Behavior: Behavior normal.          Result Review :                Assessment and Plan    Diagnoses and all orders for this visit:    1. Anxiety (Primary)  -     sertraline (Zoloft) 100 MG tablet; Take 1 tablet by mouth Daily.  Dispense: 90 tablet; Refill: 3    2. Diabetes mellitus screening  -     Comprehensive metabolic panel; Future    3. Screening cholesterol level  -     Lipid Panel        Follow Up    Return in about 1 year (around 12/14/2022).  Patient was given instructions and counseling regarding his condition or for health maintenance advice. Please see specific information pulled into the AVS if appropriate.

## 2021-12-14 NOTE — ASSESSMENT & PLAN NOTE
Restarting sertraline today.  Since this is not a new medication, he may follow-up in 1 year, or sooner if concerns.  He is followed routinely by cardiology.

## 2022-03-10 ENCOUNTER — TELEPHONE (OUTPATIENT)
Dept: FAMILY MEDICINE CLINIC | Age: 42
End: 2022-03-10

## 2022-03-10 NOTE — TELEPHONE ENCOUNTER
"COULDN'T GET A HOLD OF PT DUE TO VOICEMAIL BEING \"FULL\".  SENT LETTER TO REMIND PT OF OVERDUE LABS.  ALSO, HIS HOME PHONE NUMBER WAS INCORRECT/CA  "

## 2022-03-10 NOTE — TELEPHONE ENCOUNTER
----- Message from Samira Rodrigues LPN sent at 12/27/2021  1:28 PM EST -----      ----- Message -----  From: SYSTEM  Sent: 12/24/2021   1:30 AM EST  To: [facility]

## 2022-12-21 DIAGNOSIS — F41.9 ANXIETY: ICD-10-CM

## 2022-12-29 RX ORDER — SERTRALINE HYDROCHLORIDE 100 MG/1
TABLET, FILM COATED ORAL
Qty: 15 TABLET | Refills: 0 | Status: SHIPPED | OUTPATIENT
Start: 2022-12-29

## 2023-08-15 ENCOUNTER — OFFICE VISIT (OUTPATIENT)
Dept: FAMILY MEDICINE CLINIC | Age: 43
End: 2023-08-15
Payer: COMMERCIAL

## 2023-08-15 VITALS
WEIGHT: 283.3 LBS | SYSTOLIC BLOOD PRESSURE: 117 MMHG | HEIGHT: 74 IN | HEART RATE: 65 BPM | BODY MASS INDEX: 36.36 KG/M2 | DIASTOLIC BLOOD PRESSURE: 72 MMHG

## 2023-08-15 DIAGNOSIS — B35.3 TINEA PEDIS OF BOTH FEET: Primary | ICD-10-CM

## 2023-08-15 PROCEDURE — 99213 OFFICE O/P EST LOW 20 MIN: CPT | Performed by: PHYSICIAN ASSISTANT

## 2023-08-15 RX ORDER — PRENATAL VIT 91/IRON/FOLIC/DHA 28-975-200
1 COMBINATION PACKAGE (EA) ORAL 2 TIMES DAILY
Qty: 28 G | Refills: 1 | Status: SHIPPED | OUTPATIENT
Start: 2023-08-15 | End: 2023-08-29

## 2023-08-15 NOTE — PROGRESS NOTES
"  Diagnoses and all orders for this visit:    1. Tinea pedis of both feet (Primary)  Comments:  May need oral antifungal treatment.  Will defer to podiatry.  Trial of topical terbinafine while awaiting appointment.  Orders:  -     terbinafine (lamISIL) 1 % cream; Apply 1 application  topically to the appropriate area as directed 2 (Two) Times a Day for 14 days.  Dispense: 28 g; Refill: 1            Subjective     CHIEF COMPLAINT    Chief Complaint   Patient presents with    Tinea Pedis     Pt reports this as being an ongoing issue. Pt states that he has tried everything that he can, and this issue has still not resolved.              History of Present Illness  This is a 43-year-old male presenting to the clinic with complaint of athlete's foot for the last several months.  He actually states he has had this \"on and off his entire life.\"  It occurs mainly in the summertime and he typically can treated himself with over-the-counter treatments.  Unfortunately, this time he has been \"using everything\" and it is not helping.  Specifically he mentions trying Lotrimin, Tinactin, wearing only white socks, and changing his socks jail through the day.  He does have an appointment scheduled with podiatry on September 14, but was hoping to get something to help sooner than that.          Review of Systems   Constitutional:  Negative for chills and fever.   HENT:  Negative for facial swelling and trouble swallowing.    Respiratory:  Negative for shortness of breath and wheezing.    Gastrointestinal:  Negative for nausea and vomiting.   Skin:  Positive for rash.          History reviewed. No pertinent past medical history.         Past Surgical History:   Procedure Laterality Date    APPENDECTOMY      CHOLECYSTECTOMY      CYST REMOVAL      back    ENDOSCOPY      TONSILLECTOMY              Family History   Problem Relation Age of Onset    Hyperlipidemia Mother     Hyperlipidemia Father             Social History " "    Socioeconomic History    Marital status:    Tobacco Use    Smoking status: Never    Smokeless tobacco: Never   Vaping Use    Vaping Use: Never used   Substance and Sexual Activity    Alcohol use: Not Currently    Drug use: Never    Sexual activity: Defer            No Known Allergies         Current Outpatient Medications on File Prior to Visit   Medication Sig Dispense Refill    metoprolol succinate XL (TOPROL-XL) 25 MG 24 hr tablet Take 1 tablet by mouth Daily.      omeprazole OTC (PriLOSEC OTC) 20 MG EC tablet Prilosec OTC oral tablet,delayed release (DR/EC) 20 mg take 1 tablet by oral route daily   Active      isosorbide mononitrate (IMDUR) 30 MG 24 hr tablet Take 0.5 tablets by mouth Daily. (Patient not taking: Reported on 8/15/2023)      sertraline (ZOLOFT) 100 MG tablet TAKE ONE TABLET BY MOUTH DAILY (Patient not taking: Reported on 8/15/2023) 15 tablet 0     No current facility-administered medications on file prior to visit.            /72 (BP Location: Right arm, Patient Position: Sitting)   Pulse 65   Ht 188 cm (74\")   Wt 129 kg (283 lb 4.8 oz)   BMI 36.37 kg/mý          Objective     Physical Exam  Vitals and nursing note reviewed.   Constitutional:       General: He is not in acute distress.     Appearance: Normal appearance.   Pulmonary:      Effort: Pulmonary effort is normal. No respiratory distress.   Skin:     General: Skin is warm and dry.      Findings: Rash present. Rash is scaling.      Comments: Light erythema with scaling on bilateral toes and sides of feet.  Some fissuring noted as well.   Neurological:      Mental Status: He is alert and oriented to person, place, and time.   Psychiatric:         Mood and Affect: Mood normal.         Behavior: Behavior normal.                    Diagnoses and all orders for this visit:    1. Tinea pedis of both feet (Primary)  Comments:  May need oral antifungal treatment.  Will defer to podiatry.  Trial of topical terbinafine while " awaiting appointment.  Orders:  -     terbinafine (lamISIL) 1 % cream; Apply 1 application  topically to the appropriate area as directed 2 (Two) Times a Day for 14 days.  Dispense: 28 g; Refill: 1             FOR FULL DISCHARGE INSTRUCTIONS/COMMENTS/HANDOUTS please see the   AVS

## 2023-11-14 ENCOUNTER — OFFICE VISIT (OUTPATIENT)
Dept: FAMILY MEDICINE CLINIC | Age: 43
End: 2023-11-14
Payer: COMMERCIAL

## 2023-11-14 VITALS
SYSTOLIC BLOOD PRESSURE: 109 MMHG | HEIGHT: 74 IN | BODY MASS INDEX: 37.12 KG/M2 | DIASTOLIC BLOOD PRESSURE: 72 MMHG | RESPIRATION RATE: 18 BRPM | OXYGEN SATURATION: 96 % | WEIGHT: 289.2 LBS | HEART RATE: 72 BPM

## 2023-11-14 DIAGNOSIS — F41.1 GAD (GENERALIZED ANXIETY DISORDER): ICD-10-CM

## 2023-11-14 DIAGNOSIS — B96.89 ACUTE BACTERIAL RHINOSINUSITIS: Primary | ICD-10-CM

## 2023-11-14 DIAGNOSIS — J01.90 ACUTE BACTERIAL RHINOSINUSITIS: Primary | ICD-10-CM

## 2023-11-14 PROCEDURE — 99214 OFFICE O/P EST MOD 30 MIN: CPT | Performed by: NURSE PRACTITIONER

## 2023-11-14 RX ORDER — CETIRIZINE HYDROCHLORIDE 10 MG/1
10 TABLET ORAL DAILY
Qty: 90 TABLET | Refills: 0 | Status: SHIPPED | OUTPATIENT
Start: 2023-11-14

## 2023-11-14 RX ORDER — AMOXICILLIN AND CLAVULANATE POTASSIUM 875; 125 MG/1; MG/1
1 TABLET, FILM COATED ORAL 2 TIMES DAILY
Qty: 14 TABLET | Refills: 0 | Status: SHIPPED | OUTPATIENT
Start: 2023-11-14 | End: 2023-11-21

## 2023-11-14 RX ORDER — SERTRALINE HYDROCHLORIDE 25 MG/1
25 TABLET, FILM COATED ORAL DAILY
Qty: 90 TABLET | Refills: 0 | Status: SHIPPED | OUTPATIENT
Start: 2023-11-14

## 2023-11-14 NOTE — PROGRESS NOTES
"Bradely Amaro presents to [facility] with complaint of  Nasal Congestion (3 weeks ), Sinus Problem (3 weeks ), and Cough (3 weeks )    SUBJECTIVE  URI   This is a new problem. Episode onset: 3 weeks ago. The problem has been unchanged. There has been no fever. Associated symptoms include congestion, coughing, rhinorrhea and sinus pain. Pertinent negatives include no ear pain, headaches, plugged ear sensation, sore throat, vomiting or wheezing. He has tried nothing for the symptoms.     Patient is also asking to resume Zoloft.  He has been off this medication for about a year.  He was most recently on 100 mg daily.  He says this time of year is more stressful for him and his anxiety has returned.  He feels that he would benefit from resuming the Zoloft.  He denies issues with depression or thoughts of suicide.    OBJECTIVE  Vital Signs:   /72 (BP Location: Right arm, Patient Position: Sitting, Cuff Size: Adult)   Pulse 72   Resp 18   Ht 188 cm (74.02\")   Wt 131 kg (289 lb 3.2 oz)   SpO2 96% Comment: room air  BMI 37.12 kg/m²       Physical Exam  Constitutional:       General: He is not in acute distress.     Appearance: Normal appearance. He is not ill-appearing.   HENT:      Head: Normocephalic and atraumatic.      Nose: Nose normal. Nasal tenderness, mucosal edema, congestion and rhinorrhea present.      Right Sinus: No maxillary sinus tenderness or frontal sinus tenderness.      Left Sinus: No maxillary sinus tenderness or frontal sinus tenderness.      Mouth/Throat:      Pharynx: Posterior oropharyngeal erythema (mild) present. No oropharyngeal exudate.      Tonsils: No tonsillar exudate. 1+ on the right. 1+ on the left.   Cardiovascular:      Rate and Rhythm: Normal rate and regular rhythm.      Pulses: Normal pulses.      Heart sounds: Normal heart sounds.   Pulmonary:      Effort: Pulmonary effort is normal. No respiratory distress.      Breath sounds: Normal " breath sounds.   Chest:      Chest wall: No tenderness.   Musculoskeletal:         General: Normal range of motion.      Cervical back: Normal range of motion and neck supple.   Skin:     General: Skin is warm and dry.   Neurological:      General: No focal deficit present.      Mental Status: He is alert and oriented to person, place, and time. Mental status is at baseline.   Psychiatric:         Mood and Affect: Mood normal.         Behavior: Behavior normal.              ASSESSMENT AND PLAN:  Diagnoses and all orders for this visit:    1. Acute bacterial rhinosinusitis (Primary)  -     amoxicillin-clavulanate (AUGMENTIN) 875-125 MG per tablet; Take 1 tablet by mouth 2 (Two) Times a Day for 7 days.  Dispense: 14 tablet; Refill: 0  -     cetirizine (zyrTEC) 10 MG tablet; Take 1 tablet by mouth Daily.  Dispense: 90 tablet; Refill: 0    2. STACI (generalized anxiety disorder)  -     sertraline (Zoloft) 25 MG tablet; Take 1 tablet by mouth Daily. May increase to 2 tablets daily after 2 weeks.  Dispense: 90 tablet; Refill: 0      Patient will be treated for rhinosinusitis with Augmentin and Zyrtec.  Patient would like to resume Zoloft.  Discussed that it would not be safe to start back at 100 mg dosing.  For this reason, he will start 25 mg once per day, was given instructions to increase to 2 tablets after 2 weeks if 25 mg is not effective.  He has not seen his PCP since December 2021. Follow up visit with his PCP scheduled for reassessment of his anxiety.  Also discussed with patient that screening lab work may be recommended at his follow-up visit.      Follow Up   Return in about 1 month (around 12/14/2023). Patient to notify office with any acute concerns or issues.  Patient verbalizes understanding, agrees with plan of care and has no further questions upon discharge.     Patient was given instructions and counseling regarding his condition or for health maintenance advice. Please see specific information pulled  into the AVS if appropriate.     Discussed the importance of following up with any needed screening tests/labs/specialist appointments and any requested follow-up recommended by me today. Importance of maintaining follow-up discussed and patient accepts that missed appointments can delay diagnosis and potentially lead to worsening of conditions.    Part of this note may be an electronic transcription/translation of spoken language to printed text using the Dragon Dictation System.

## 2023-12-18 ENCOUNTER — TELEPHONE (OUTPATIENT)
Dept: FAMILY MEDICINE CLINIC | Age: 43
End: 2023-12-18
Payer: COMMERCIAL

## 2023-12-18 NOTE — TELEPHONE ENCOUNTER
Called patient to inform on first no show 12/14/23 at 3:45 was unable to reach so a letter was sent.

## 2024-02-14 ENCOUNTER — OFFICE VISIT (OUTPATIENT)
Dept: FAMILY MEDICINE CLINIC | Age: 44
End: 2024-02-14
Payer: COMMERCIAL

## 2024-02-14 VITALS
WEIGHT: 290.8 LBS | HEIGHT: 74 IN | SYSTOLIC BLOOD PRESSURE: 100 MMHG | BODY MASS INDEX: 37.32 KG/M2 | HEART RATE: 87 BPM | DIASTOLIC BLOOD PRESSURE: 65 MMHG | OXYGEN SATURATION: 94 %

## 2024-02-14 DIAGNOSIS — F41.8 ANXIETY WITH DEPRESSION: ICD-10-CM

## 2024-02-14 DIAGNOSIS — Z13.1 SCREENING FOR DIABETES MELLITUS: ICD-10-CM

## 2024-02-14 DIAGNOSIS — Z13.220 SCREENING CHOLESTEROL LEVEL: ICD-10-CM

## 2024-02-14 DIAGNOSIS — Z13.29 THYROID DISORDER SCREEN: Primary | ICD-10-CM

## 2024-02-14 DIAGNOSIS — R53.83 OTHER FATIGUE: ICD-10-CM

## 2024-02-14 DIAGNOSIS — I47.10 SVT (SUPRAVENTRICULAR TACHYCARDIA): ICD-10-CM

## 2024-02-14 DIAGNOSIS — Z13.0 SCREENING FOR DEFICIENCY ANEMIA: ICD-10-CM

## 2024-02-14 RX ORDER — SERTRALINE HYDROCHLORIDE 100 MG/1
100 TABLET, FILM COATED ORAL DAILY
Qty: 90 TABLET | Refills: 3 | Status: SHIPPED | OUTPATIENT
Start: 2024-02-14

## 2024-02-14 RX ORDER — METOPROLOL SUCCINATE 25 MG/1
25 TABLET, EXTENDED RELEASE ORAL DAILY
Qty: 90 TABLET | Refills: 0 | Status: SHIPPED | OUTPATIENT
Start: 2024-02-14

## 2024-03-11 ENCOUNTER — LAB (OUTPATIENT)
Dept: LAB | Facility: HOSPITAL | Age: 44
End: 2024-03-11
Payer: COMMERCIAL

## 2024-03-11 DIAGNOSIS — Z13.29 THYROID DISORDER SCREEN: ICD-10-CM

## 2024-03-11 DIAGNOSIS — Z13.1 SCREENING FOR DIABETES MELLITUS: ICD-10-CM

## 2024-03-11 DIAGNOSIS — Z13.220 SCREENING CHOLESTEROL LEVEL: ICD-10-CM

## 2024-03-11 DIAGNOSIS — Z13.0 SCREENING FOR DEFICIENCY ANEMIA: ICD-10-CM

## 2024-03-11 DIAGNOSIS — R53.83 OTHER FATIGUE: ICD-10-CM

## 2024-03-11 LAB
ALBUMIN SERPL-MCNC: 4.4 G/DL (ref 3.5–5.2)
ALBUMIN/GLOB SERPL: 1.6 G/DL
ALP SERPL-CCNC: 101 U/L (ref 39–117)
ALT SERPL W P-5'-P-CCNC: 69 U/L (ref 1–41)
ANION GAP SERPL CALCULATED.3IONS-SCNC: 10.7 MMOL/L (ref 5–15)
AST SERPL-CCNC: 41 U/L (ref 1–40)
BASOPHILS # BLD AUTO: 0.04 10*3/MM3 (ref 0–0.2)
BASOPHILS NFR BLD AUTO: 0.4 % (ref 0–1.5)
BILIRUB SERPL-MCNC: 0.5 MG/DL (ref 0–1.2)
BUN SERPL-MCNC: 16 MG/DL (ref 6–20)
BUN/CREAT SERPL: 14.4 (ref 7–25)
CALCIUM SPEC-SCNC: 9.3 MG/DL (ref 8.6–10.5)
CHLORIDE SERPL-SCNC: 103 MMOL/L (ref 98–107)
CHOLEST SERPL-MCNC: 163 MG/DL (ref 0–200)
CO2 SERPL-SCNC: 25.3 MMOL/L (ref 22–29)
CREAT SERPL-MCNC: 1.11 MG/DL (ref 0.76–1.27)
DEPRECATED RDW RBC AUTO: 42.9 FL (ref 37–54)
EGFRCR SERPLBLD CKD-EPI 2021: 84.5 ML/MIN/1.73
EOSINOPHIL # BLD AUTO: 0.13 10*3/MM3 (ref 0–0.4)
EOSINOPHIL NFR BLD AUTO: 1.4 % (ref 0.3–6.2)
ERYTHROCYTE [DISTWIDTH] IN BLOOD BY AUTOMATED COUNT: 12.6 % (ref 12.3–15.4)
GLOBULIN UR ELPH-MCNC: 2.7 GM/DL
GLUCOSE SERPL-MCNC: 114 MG/DL (ref 65–99)
HCT VFR BLD AUTO: 45.6 % (ref 37.5–51)
HDLC SERPL-MCNC: 41 MG/DL (ref 40–60)
HGB BLD-MCNC: 15.2 G/DL (ref 13–17.7)
IMM GRANULOCYTES # BLD AUTO: 0.03 10*3/MM3 (ref 0–0.05)
IMM GRANULOCYTES NFR BLD AUTO: 0.3 % (ref 0–0.5)
LDLC SERPL CALC-MCNC: 107 MG/DL (ref 0–100)
LDLC/HDLC SERPL: 2.59 {RATIO}
LYMPHOCYTES # BLD AUTO: 2.53 10*3/MM3 (ref 0.7–3.1)
LYMPHOCYTES NFR BLD AUTO: 26.9 % (ref 19.6–45.3)
MCH RBC QN AUTO: 30.4 PG (ref 26.6–33)
MCHC RBC AUTO-ENTMCNC: 33.3 G/DL (ref 31.5–35.7)
MCV RBC AUTO: 91.2 FL (ref 79–97)
MONOCYTES # BLD AUTO: 0.62 10*3/MM3 (ref 0.1–0.9)
MONOCYTES NFR BLD AUTO: 6.6 % (ref 5–12)
NEUTROPHILS NFR BLD AUTO: 6.05 10*3/MM3 (ref 1.7–7)
NEUTROPHILS NFR BLD AUTO: 64.4 % (ref 42.7–76)
PLATELET # BLD AUTO: 239 10*3/MM3 (ref 140–450)
PMV BLD AUTO: 9.3 FL (ref 6–12)
POTASSIUM SERPL-SCNC: 4.3 MMOL/L (ref 3.5–5.2)
PROT SERPL-MCNC: 7.1 G/DL (ref 6–8.5)
RBC # BLD AUTO: 5 10*6/MM3 (ref 4.14–5.8)
SODIUM SERPL-SCNC: 139 MMOL/L (ref 136–145)
TRIGL SERPL-MCNC: 79 MG/DL (ref 0–150)
TSH SERPL DL<=0.05 MIU/L-ACNC: 1.74 UIU/ML (ref 0.27–4.2)
VIT B12 BLD-MCNC: 420 PG/ML (ref 211–946)
VLDLC SERPL-MCNC: 15 MG/DL (ref 5–40)
WBC NRBC COR # BLD AUTO: 9.4 10*3/MM3 (ref 3.4–10.8)

## 2024-03-11 PROCEDURE — 80061 LIPID PANEL: CPT

## 2024-03-11 PROCEDURE — 80050 GENERAL HEALTH PANEL: CPT

## 2024-03-11 PROCEDURE — 36415 COLL VENOUS BLD VENIPUNCTURE: CPT

## 2024-03-11 PROCEDURE — 84403 ASSAY OF TOTAL TESTOSTERONE: CPT

## 2024-03-11 PROCEDURE — 82607 VITAMIN B-12: CPT

## 2024-03-11 PROCEDURE — 84402 ASSAY OF FREE TESTOSTERONE: CPT

## 2024-03-13 LAB
TESTOST FREE SERPL-MCNC: 8.3 PG/ML (ref 6.8–21.5)
TESTOST SERPL-MCNC: 252 NG/DL (ref 264–916)

## 2024-03-15 NOTE — PROGRESS NOTES
You are not anemic and vitamin B12 level is normal.  Total testosterone is slightly low but free testosterone is normal.  I feel as if the free testosterone number is the most important of the 2 numbers.  This finding should not be contributing to any fatigue.  Blood sugar is mildly elevated liver enzymes are mildly elevated but improved compared to 6 years ago.  Lipid panel looks good.  Kidney and thyroid function are normal.

## 2024-05-15 ENCOUNTER — TELEPHONE (OUTPATIENT)
Dept: FAMILY MEDICINE CLINIC | Age: 44
End: 2024-05-15
Payer: COMMERCIAL

## 2024-05-15 DIAGNOSIS — I47.10 SVT (SUPRAVENTRICULAR TACHYCARDIA): ICD-10-CM

## 2024-05-15 NOTE — TELEPHONE ENCOUNTER
Caller: Bradley Amaro    Relationship: Self    Best call back number: [phone]         What test was performed: 03/11    When was the test performed: LABS    Where was the test performed: [facility]    Additional notes: PATIENT REPORTS HE HAS NOT HEARD ABOUT HIS RESULTS -  PLEASE CONTACT

## 2024-05-15 NOTE — TELEPHONE ENCOUNTER
Caller: Bradley Amaro    Relationship: Self    Best call back number: [phone]     Requested Prescriptions:   Requested Prescriptions     Pending Prescriptions Disp Refills    metoprolol succinate XL (TOPROL-XL) 25 MG 24 hr tablet 90 tablet 0     Sig: Take 1 tablet by mouth Daily.        Pharmacy where request should be sent: [facility] [id number] [place], [place] - [address] [facility] - [phone]  - [phone] FX     Last office visit with prescribing clinician: 2/14/2024   Last telemedicine visit with prescribing clinician: Visit date not found   Next office visit with prescribing clinician: Visit date not found         Does the patient have less than a 3 day supply:  [] Yes  [x] No      Lawrence Meza Rep   05/15/24 15:45 EDT

## 2024-05-16 RX ORDER — METOPROLOL SUCCINATE 25 MG/1
25 TABLET, EXTENDED RELEASE ORAL DAILY
Qty: 90 TABLET | Refills: 0 | Status: SHIPPED | OUTPATIENT
Start: 2024-05-16

## 2024-08-25 DIAGNOSIS — I47.10 SVT (SUPRAVENTRICULAR TACHYCARDIA): ICD-10-CM

## 2024-08-27 RX ORDER — METOPROLOL SUCCINATE 25 MG/1
25 TABLET, EXTENDED RELEASE ORAL DAILY
Qty: 30 TABLET | Refills: 0 | Status: SHIPPED | OUTPATIENT
Start: 2024-08-27

## 2024-09-03 ENCOUNTER — OFFICE VISIT (OUTPATIENT)
Dept: FAMILY MEDICINE CLINIC | Age: 44
End: 2024-09-03
Payer: COMMERCIAL

## 2024-09-03 VITALS
WEIGHT: 294 LBS | OXYGEN SATURATION: 96 % | HEART RATE: 68 BPM | TEMPERATURE: 98.2 F | BODY MASS INDEX: 37.73 KG/M2 | DIASTOLIC BLOOD PRESSURE: 70 MMHG | SYSTOLIC BLOOD PRESSURE: 109 MMHG | HEIGHT: 74 IN

## 2024-09-03 DIAGNOSIS — H02.63 XANTHELASMA OF EYELID, BILATERAL: ICD-10-CM

## 2024-09-03 DIAGNOSIS — I47.10 SVT (SUPRAVENTRICULAR TACHYCARDIA): ICD-10-CM

## 2024-09-03 DIAGNOSIS — L91.8 SKIN TAG: ICD-10-CM

## 2024-09-03 DIAGNOSIS — Z13.1 SCREENING FOR DIABETES MELLITUS: ICD-10-CM

## 2024-09-03 DIAGNOSIS — R53.83 OTHER FATIGUE: ICD-10-CM

## 2024-09-03 DIAGNOSIS — I77.810 DILATED AORTIC ROOT: Primary | ICD-10-CM

## 2024-09-03 DIAGNOSIS — F41.8 ANXIETY WITH DEPRESSION: ICD-10-CM

## 2024-09-03 DIAGNOSIS — Z13.220 SCREENING CHOLESTEROL LEVEL: ICD-10-CM

## 2024-09-03 DIAGNOSIS — H02.66 XANTHELASMA OF EYELID, BILATERAL: ICD-10-CM

## 2024-09-03 PROBLEM — F41.9 ANXIETY DISORDER: Status: RESOLVED | Noted: 2021-12-14 | Resolved: 2024-09-03

## 2024-09-03 PROBLEM — F32.A DEPRESSION: Status: RESOLVED | Noted: 2021-12-14 | Resolved: 2024-09-03

## 2024-09-03 PROCEDURE — 99214 OFFICE O/P EST MOD 30 MIN: CPT | Performed by: NURSE PRACTITIONER

## 2024-09-03 RX ORDER — SERTRALINE HYDROCHLORIDE 100 MG/1
100 TABLET, FILM COATED ORAL DAILY
Qty: 90 TABLET | Refills: 1 | Status: SHIPPED | OUTPATIENT
Start: 2024-09-03

## 2024-09-03 RX ORDER — METOPROLOL SUCCINATE 25 MG/1
25 TABLET, EXTENDED RELEASE ORAL DAILY
Qty: 90 TABLET | Refills: 1 | Status: SHIPPED | OUTPATIENT
Start: 2024-09-03

## 2024-09-03 NOTE — ASSESSMENT & PLAN NOTE
Continues to use CPAP but has not had settings checked on his machine.  He will return between 8 and 10 AM for recheck of testosterone level.  Further treatment recommendations pending lab results.  
Patient states he will follow-up with Dr. Busby  
Patient/surrogate refused vaccine...

## 2024-09-03 NOTE — PROGRESS NOTES
"Chief Complaint  Anxiety, Depression, and Rapid Heart Rate    Subjective          Bradley Amaro presents to [facility]     Patient is a 44-year-old male who is here today to follow-up regarding anxiety and depression.  Symptoms currently stable on sertraline 100 mg daily.  Denies side effects and requests refills.    History of SVT and dilated aortic root diagnosed in 2021.  Dilated aortic root was confirmed on MRI.  Cardiology, Dr. Busby, recommended annual follow-up for further monitoring.  Patient states he will call to have that follow-up appointment scheduled.  I will continue to refill his metoprolol XL 25 mg daily.  Patient will occasionally feel chest pain.  Has not experienced any worsening of symptoms or more frequent symptoms.  Does have a mild level of fatigue.  Fatigue workup previously normal with exception of mildly low total testosterone.  Fast heart rate symptoms are stable with use of metoprolol.  Denies medication side effects.    Patient also reports multiple skin tags and unspecified areas of bilateral upper eyelids.     Objective   Vital Signs:   Vitals:    09/03/24 1550   BP: 109/70   BP Location: Right arm   Patient Position: Sitting   Cuff Size: Adult   Pulse: 68   Temp: 98.2 °F (36.8 °C)   TempSrc: Oral   SpO2: 96%   Weight: 133 kg (294 lb)   Height: 188 cm (74.02\")       Wt Readings from Last 3 Encounters:   09/03/24 133 kg (294 lb)   02/14/24 132 kg (290 lb 12.8 oz)   11/14/23 131 kg (289 lb 3.2 oz)      BP Readings from Last 3 Encounters:   09/03/24 109/70   02/14/24 100/65   11/14/23 109/72       Body mass index is 37.73 kg/m².             Physical Exam  Vitals reviewed.   Constitutional:       General: He is not in acute distress.     Appearance: Normal appearance. He is well-developed. He is obese.   HENT:      Head:      Comments: Xanthelasma of each upper, inner eyelid.  Neck:      Thyroid: No thyromegaly.   Cardiovascular:      Rate and Rhythm: " Normal rate and regular rhythm.      Heart sounds: Normal heart sounds.   Pulmonary:      Effort: Pulmonary effort is normal.      Breath sounds: Normal breath sounds.   Chest:       Musculoskeletal:      Right lower leg: No edema.      Left lower leg: No edema.   Skin:     General: Skin is warm and dry.   Neurological:      General: No focal deficit present.      Mental Status: He is alert.   Psychiatric:         Attention and Perception: Attention normal.         Mood and Affect: Mood and affect normal.         Behavior: Behavior normal.           Current Outpatient Medications:     metoprolol succinate XL (TOPROL-XL) 25 MG 24 hr tablet, Take 1 tablet by mouth Daily., Disp: 90 tablet, Rfl: 1    omeprazole OTC (PriLOSEC OTC) 20 MG EC tablet, , Disp: , Rfl:     sertraline (Zoloft) 100 MG tablet, Take 1 tablet by mouth Daily., Disp: 90 tablet, Rfl: 1   History reviewed. No pertinent past medical history.  No Known Allergies            Result Review :     Common labs          3/11/2024    09:41   Common Labs   Glucose 114    BUN 16    Creatinine 1.11    Sodium 139    Potassium 4.3    Chloride 103    Calcium 9.3    Albumin 4.4    Total Bilirubin 0.5    Alkaline Phosphatase 101    AST (SGOT) 41    ALT (SGPT) 69    WBC 9.40    Hemoglobin 15.2    Hematocrit 45.6    Platelets 239    Total Cholesterol 163    Triglycerides 79    HDL Cholesterol 41    LDL Cholesterol  107         No Images in the past 120 days found..           Social History     Tobacco Use   Smoking Status Never   Smokeless Tobacco Never           Assessment and Plan    Diagnoses and all orders for this visit:    1. Dilated aortic root (Primary)  Assessment & Plan:  Patient states he will follow-up with Dr. Busby      2. SVT (supraventricular tachycardia)  -     metoprolol succinate XL (TOPROL-XL) 25 MG 24 hr tablet; Take 1 tablet by mouth Daily.  Dispense: 90 tablet; Refill: 1    3. Anxiety with depression  -     sertraline (Zoloft) 100 MG tablet; Take 1  tablet by mouth Daily.  Dispense: 90 tablet; Refill: 1    4. Other fatigue  -     Testosterone, Free, Total; Future    5. Screening cholesterol level  -     Lipid panel; Future    6. Screening for diabetes mellitus  -     Comprehensive metabolic panel; Future  -     Hemoglobin A1c; Future    7. Xanthelasma of eyelid, bilateral  -     Ambulatory Referral to Dermatology    8. Skin tag  -     Ambulatory Referral to Dermatology        Follow Up    Return in about 6 months (around 3/3/2025).  Patient was given instructions and counseling regarding his condition or for health maintenance advice. Please see specific information pulled into the AVS if appropriate.

## 2024-10-11 ENCOUNTER — TELEPHONE (OUTPATIENT)
Dept: FAMILY MEDICINE CLINIC | Age: 44
End: 2024-10-11
Payer: COMMERCIAL

## 2024-10-15 ENCOUNTER — LAB (OUTPATIENT)
Dept: LAB | Facility: HOSPITAL | Age: 44
End: 2024-10-15
Payer: COMMERCIAL

## 2024-10-15 DIAGNOSIS — R53.83 OTHER FATIGUE: ICD-10-CM

## 2024-10-15 DIAGNOSIS — Z13.220 SCREENING CHOLESTEROL LEVEL: ICD-10-CM

## 2024-10-15 DIAGNOSIS — Z13.1 SCREENING FOR DIABETES MELLITUS: ICD-10-CM

## 2024-10-15 LAB
ALBUMIN SERPL-MCNC: 4.3 G/DL (ref 3.5–5.2)
ALBUMIN/GLOB SERPL: 1.6 G/DL
ALP SERPL-CCNC: 104 U/L (ref 39–117)
ALT SERPL W P-5'-P-CCNC: 69 U/L (ref 1–41)
ANION GAP SERPL CALCULATED.3IONS-SCNC: 12.2 MMOL/L (ref 5–15)
AST SERPL-CCNC: 37 U/L (ref 1–40)
BILIRUB SERPL-MCNC: 0.3 MG/DL (ref 0–1.2)
BUN SERPL-MCNC: 19 MG/DL (ref 6–20)
BUN/CREAT SERPL: 16.8 (ref 7–25)
CALCIUM SPEC-SCNC: 9.4 MG/DL (ref 8.6–10.5)
CHLORIDE SERPL-SCNC: 104 MMOL/L (ref 98–107)
CHOLEST SERPL-MCNC: 156 MG/DL (ref 0–200)
CO2 SERPL-SCNC: 24.8 MMOL/L (ref 22–29)
CREAT SERPL-MCNC: 1.13 MG/DL (ref 0.76–1.27)
EGFRCR SERPLBLD CKD-EPI 2021: 82.7 ML/MIN/1.73
GLOBULIN UR ELPH-MCNC: 2.7 GM/DL
GLUCOSE SERPL-MCNC: 105 MG/DL (ref 65–99)
HBA1C MFR BLD: 6 % (ref 4.8–5.6)
HDLC SERPL-MCNC: 35 MG/DL (ref 40–60)
LDLC SERPL CALC-MCNC: 100 MG/DL (ref 0–100)
LDLC/HDLC SERPL: 2.81 {RATIO}
POTASSIUM SERPL-SCNC: 4.4 MMOL/L (ref 3.5–5.2)
PROT SERPL-MCNC: 7 G/DL (ref 6–8.5)
SODIUM SERPL-SCNC: 141 MMOL/L (ref 136–145)
TRIGL SERPL-MCNC: 113 MG/DL (ref 0–150)
VLDLC SERPL-MCNC: 21 MG/DL (ref 5–40)

## 2024-10-15 PROCEDURE — 83036 HEMOGLOBIN GLYCOSYLATED A1C: CPT

## 2024-10-15 PROCEDURE — 84403 ASSAY OF TOTAL TESTOSTERONE: CPT

## 2024-10-15 PROCEDURE — 80061 LIPID PANEL: CPT

## 2024-10-15 PROCEDURE — 84402 ASSAY OF FREE TESTOSTERONE: CPT

## 2024-10-15 PROCEDURE — 80053 COMPREHEN METABOLIC PANEL: CPT

## 2024-10-15 PROCEDURE — 36415 COLL VENOUS BLD VENIPUNCTURE: CPT

## 2024-10-19 LAB
TESTOST FREE SERPL-MCNC: 5.5 PG/ML (ref 6.8–21.5)
TESTOST SERPL-MCNC: 277 NG/DL (ref 264–916)

## 2025-04-21 DIAGNOSIS — I47.10 SVT (SUPRAVENTRICULAR TACHYCARDIA): ICD-10-CM

## 2025-04-21 RX ORDER — METOPROLOL SUCCINATE 25 MG/1
25 TABLET, EXTENDED RELEASE ORAL DAILY
Qty: 30 TABLET | Refills: 0 | Status: SHIPPED | OUTPATIENT
Start: 2025-04-21

## 2025-05-07 ENCOUNTER — OFFICE VISIT (OUTPATIENT)
Dept: FAMILY MEDICINE CLINIC | Age: 45
End: 2025-05-07
Payer: COMMERCIAL

## 2025-05-07 VITALS
TEMPERATURE: 96.7 F | HEART RATE: 68 BPM | DIASTOLIC BLOOD PRESSURE: 49 MMHG | HEIGHT: 74 IN | OXYGEN SATURATION: 97 % | WEIGHT: 289 LBS | SYSTOLIC BLOOD PRESSURE: 101 MMHG | BODY MASS INDEX: 37.09 KG/M2

## 2025-05-07 DIAGNOSIS — E78.2 MIXED HYPERLIPIDEMIA: ICD-10-CM

## 2025-05-07 DIAGNOSIS — R73.03 PREDIABETES: Primary | ICD-10-CM

## 2025-05-07 DIAGNOSIS — I47.10 SVT (SUPRAVENTRICULAR TACHYCARDIA): ICD-10-CM

## 2025-05-07 DIAGNOSIS — M54.50 BILATERAL LOW BACK PAIN WITHOUT SCIATICA, UNSPECIFIED CHRONICITY: ICD-10-CM

## 2025-05-07 DIAGNOSIS — F41.8 ANXIETY WITH DEPRESSION: ICD-10-CM

## 2025-05-07 PROCEDURE — 99213 OFFICE O/P EST LOW 20 MIN: CPT | Performed by: NURSE PRACTITIONER

## 2025-05-07 RX ORDER — SERTRALINE HYDROCHLORIDE 100 MG/1
100 TABLET, FILM COATED ORAL DAILY
Qty: 90 TABLET | Refills: 1 | Status: SHIPPED | OUTPATIENT
Start: 2025-05-07

## 2025-05-07 RX ORDER — METOPROLOL SUCCINATE 25 MG/1
25 TABLET, EXTENDED RELEASE ORAL DAILY
Qty: 30 TABLET | Refills: 0 | Status: SHIPPED | OUTPATIENT
Start: 2025-05-07

## 2025-05-07 NOTE — PROGRESS NOTES
"Chief Complaint  Anxiety (6 month follow up ), Depression, and Rapid Heart Rate    Subjective          Bradley Amaro presents to [facility]     Patient is a 45-year-old male with history of SVT and dilated arctic root diagnosed in 2021 dilated aortic root was confirmed on MRI.  Cardiology Dr. Busby recommended annual follow-up for further monitoring.  Patient states he will call and get that scheduled.  He is asymptomatic for tachycardia.  He remains on metoprolol 25 mg daily.  Denies side effects and requests refills.    Anxiety depression are stable on sertraline 100 mg daily.  Denies side effects and requests refills.       Intermittent low back pain unrelated to injury.  It is worse when he gets up out of bed in the morning and gets better as he moves around.  He denies any numbness or tingling or pain radiating into his extremities.  Will take Tylenol or ibuprofen as needed.  Had spine imaging done about 10 years ago or more that noted degenerative disc disease.  He is considering getting a new mattress.    Tdap vaccine is recommended as patient has not had a tetanus shot to his knowledge in the last 10 years.  He may receive that vaccine here or at his local pharmacy.    October labs are reviewed with patient.  Kidney function normal and liver function improved.  Patient would prefer to get once per year labs.  He will get fasting labs done prior to his next visit in November.     Objective   Vital Signs:   Vitals:    05/07/25 1528   BP: 101/49   BP Location: Left arm   Patient Position: Sitting   Cuff Size: Adult   Pulse: 68   Temp: 96.7 °F (35.9 °C)   TempSrc: Temporal   SpO2: 97%   Weight: 131 kg (289 lb)   Height: 188 cm (74\")       Wt Readings from Last 3 Encounters:   05/07/25 131 kg (289 lb)   09/03/24 133 kg (294 lb)   02/14/24 132 kg (290 lb 12.8 oz)      BP Readings from Last 3 Encounters:   05/07/25 101/49   09/03/24 109/70   02/14/24 100/65       Body mass index " is 37.11 kg/m².             Physical Exam  Vitals reviewed.   Constitutional:       General: He is not in acute distress.     Appearance: Normal appearance. He is well-developed. He is obese.   Neck:      Thyroid: No thyromegaly.      Vascular: No carotid bruit.   Cardiovascular:      Rate and Rhythm: Normal rate and regular rhythm.      Pulses:           Posterior tibial pulses are 2+ on the right side and 2+ on the left side.      Heart sounds: Normal heart sounds.   Pulmonary:      Effort: Pulmonary effort is normal.      Breath sounds: Normal breath sounds.   Musculoskeletal:      Right lower leg: No edema.      Left lower leg: No edema.        Legs:    Skin:     General: Skin is warm and dry.   Neurological:      General: No focal deficit present.      Mental Status: He is alert.   Psychiatric:         Attention and Perception: Attention normal.         Mood and Affect: Mood and affect normal.         Behavior: Behavior normal.           Current Outpatient Medications:     metoprolol succinate XL (TOPROL-XL) 25 MG 24 hr tablet, Take 1 tablet by mouth Daily., Disp: 30 tablet, Rfl: 0    omeprazole OTC (PriLOSEC OTC) 20 MG EC tablet, , Disp: , Rfl:     sertraline (Zoloft) 100 MG tablet, Take 1 tablet by mouth Daily., Disp: 90 tablet, Rfl: 1   History reviewed. No pertinent past medical history.  No Known Allergies            Result Review :     Common labs          10/15/2024    08:45   Common Labs   Glucose 105    BUN 19    Creatinine 1.13    Sodium 141    Potassium 4.4    Chloride 104    Calcium 9.4    Albumin 4.3    Total Bilirubin 0.3    Alkaline Phosphatase 104    AST (SGOT) 37    ALT (SGPT) 69    Total Cholesterol 156    Triglycerides 113    HDL Cholesterol 35    LDL Cholesterol  100    Hemoglobin A1C 6.00         No Images in the past 120 days found..           Social History     Tobacco Use   Smoking Status Never   Smokeless Tobacco Never           Assessment and Plan    Diagnoses and all orders for this  visit:    1. Prediabetes (Primary)  -     Hemoglobin A1c; Future  -     Comprehensive metabolic panel; Future    2. Anxiety with depression  -     sertraline (Zoloft) 100 MG tablet; Take 1 tablet by mouth Daily.  Dispense: 90 tablet; Refill: 1    3. SVT (supraventricular tachycardia)  -     metoprolol succinate XL (TOPROL-XL) 25 MG 24 hr tablet; Take 1 tablet by mouth Daily.  Dispense: 30 tablet; Refill: 0    4. Mixed hyperlipidemia  -     Lipid panel; Future        Follow Up    No follow-ups on file.  Patient was given instructions and counseling regarding his condition or for health maintenance advice. Please see specific information pulled into the AVS if appropriate.

## 2025-05-07 NOTE — ASSESSMENT & PLAN NOTE
Defers prescription for muscle relaxer.  Discussed conservative measures including application of heat and cold, anti-inflammatories, muscle relaxers, and consider updated imaging and physical therapy if not improving.  Patient did attend physical therapy in the past any is aware of stretches and exercises that he can implement.  He states he will consider getting a new mattress to see if that will help.

## 2025-06-11 ENCOUNTER — HOSPITAL ENCOUNTER (OUTPATIENT)
Dept: GENERAL RADIOLOGY | Facility: HOSPITAL | Age: 45
Discharge: HOME | End: 2025-06-11
Admitting: NURSE PRACTITIONER
Payer: COMMERCIAL

## 2025-06-11 ENCOUNTER — OFFICE VISIT (OUTPATIENT)
Dept: FAMILY MEDICINE CLINIC | Age: 45
End: 2025-06-11
Payer: COMMERCIAL

## 2025-06-11 VITALS
TEMPERATURE: 97.2 F | OXYGEN SATURATION: 97 % | HEART RATE: 77 BPM | DIASTOLIC BLOOD PRESSURE: 60 MMHG | WEIGHT: 288 LBS | BODY MASS INDEX: 36.96 KG/M2 | HEIGHT: 74 IN | SYSTOLIC BLOOD PRESSURE: 109 MMHG

## 2025-06-11 DIAGNOSIS — M54.50 CHRONIC BILATERAL LOW BACK PAIN WITHOUT SCIATICA: ICD-10-CM

## 2025-06-11 DIAGNOSIS — G89.29 CHRONIC BILATERAL LOW BACK PAIN WITHOUT SCIATICA: Primary | ICD-10-CM

## 2025-06-11 DIAGNOSIS — M54.50 CHRONIC BILATERAL LOW BACK PAIN WITHOUT SCIATICA: Primary | ICD-10-CM

## 2025-06-11 DIAGNOSIS — G89.29 CHRONIC BILATERAL LOW BACK PAIN WITHOUT SCIATICA: ICD-10-CM

## 2025-06-11 PROCEDURE — 72100 X-RAY EXAM L-S SPINE 2/3 VWS: CPT

## 2025-06-11 RX ORDER — TRIAMCINOLONE ACETONIDE 40 MG/ML
60 INJECTION, SUSPENSION INTRA-ARTICULAR; INTRAMUSCULAR ONCE
Status: COMPLETED | OUTPATIENT
Start: 2025-06-11 | End: 2025-06-11

## 2025-06-11 RX ORDER — BACLOFEN 10 MG/1
10 TABLET ORAL 3 TIMES DAILY PRN
Qty: 30 TABLET | Refills: 1 | Status: SHIPPED | OUTPATIENT
Start: 2025-06-11

## 2025-06-11 RX ADMIN — TRIAMCINOLONE ACETONIDE 60 MG: 40 INJECTION, SUSPENSION INTRA-ARTICULAR; INTRAMUSCULAR at 16:10

## 2025-06-11 NOTE — ASSESSMENT & PLAN NOTE
Further treatment pending x-ray results.  Consider physical therapy.  Baclofen may cause drowsiness.

## 2025-06-11 NOTE — PROGRESS NOTES
"Chief Complaint  Back Pain (Patient c/o low back pain for 3-4 months not getting better discussed at last appt )    Subjective          Bradley Amaro presents to [facility]     Patient is a 45-year-old male who is here today regarding  Intermittent low back pain unrelated to injury has become more persistent.  It is worse when he gets up out of bed in the morning and walks hunched over for several minutes or more. He denies any numbness or tingling or pain radiating into his extremities. Will take Tylenol or ibuprofen as needed. Had spine imaging done about 10 years ago or more that noted degenerative disc disease.        Objective   Vital Signs:   Vitals:    06/11/25 1537   BP: 109/60   BP Location: Left arm   Patient Position: Sitting   Cuff Size: Adult   Pulse: 77   Temp: 97.2 °F (36.2 °C)   TempSrc: Temporal   SpO2: 97%   Weight: 131 kg (288 lb)   Height: 188 cm (74\")   PainSc: 8    PainLoc: Back       Wt Readings from Last 3 Encounters:   06/11/25 131 kg (288 lb)   05/07/25 131 kg (289 lb)   09/03/24 133 kg (294 lb)      BP Readings from Last 3 Encounters:   06/11/25 109/60   05/07/25 101/49   09/03/24 109/70       Body mass index is 36.98 kg/m².             Physical Exam  Vitals reviewed.   Constitutional:       General: He is not in acute distress.     Appearance: Normal appearance. He is well-developed. He is obese.   Cardiovascular:      Rate and Rhythm: Normal rate and regular rhythm.      Heart sounds: Normal heart sounds.   Pulmonary:      Effort: Pulmonary effort is normal.      Breath sounds: Normal breath sounds.   Musculoskeletal:      Right lower leg: No edema.      Left lower leg: No edema.        Legs:    Skin:     General: Skin is warm and dry.   Neurological:      General: No focal deficit present.      Mental Status: He is alert.   Psychiatric:         Attention and Perception: Attention normal.         Mood and Affect: Mood and affect normal.         Behavior: " Behavior normal.           Current Outpatient Medications:     metoprolol succinate XL (TOPROL-XL) 25 MG 24 hr tablet, Take 1 tablet by mouth Daily., Disp: 30 tablet, Rfl: 0    omeprazole OTC (PriLOSEC OTC) 20 MG EC tablet, , Disp: , Rfl:     sertraline (Zoloft) 100 MG tablet, Take 1 tablet by mouth Daily., Disp: 90 tablet, Rfl: 1    baclofen (LIORESAL) 10 MG tablet, Take 1 tablet by mouth 3 (Three) Times a Day As Needed for Muscle Spasms. May cause drowsiness, Disp: 30 tablet, Rfl: 1  No current facility-administered medications for this visit.   History reviewed. No pertinent past medical history.  No Known Allergies            Result Review :     Common labs          10/15/2024    08:45   Common Labs   Glucose 105    BUN 19    Creatinine 1.13    Sodium 141    Potassium 4.4    Chloride 104    Calcium 9.4    Albumin 4.3    Total Bilirubin 0.3    Alkaline Phosphatase 104    AST (SGOT) 37    ALT (SGPT) 69    Total Cholesterol 156    Triglycerides 113    HDL Cholesterol 35    LDL Cholesterol  100    Hemoglobin A1C 6.00         No Images in the past 120 days found..           Social History     Tobacco Use   Smoking Status Never   Smokeless Tobacco Never           Assessment and Plan    Diagnoses and all orders for this visit:    1. Chronic bilateral low back pain without sciatica (Primary)  Assessment & Plan:  Further treatment pending x-ray results.  Consider physical therapy.  Baclofen may cause drowsiness.    Orders:  -     XR Spine Lumbar 2 or 3 View; Future  -     baclofen (LIORESAL) 10 MG tablet; Take 1 tablet by mouth 3 (Three) Times a Day As Needed for Muscle Spasms. May cause drowsiness  Dispense: 30 tablet; Refill: 1  -     triamcinolone acetonide (KENALOG-40) injection 60 mg        Follow Up    Return if symptoms worsen or fail to improve.  Patient was given instructions and counseling regarding his condition or for health maintenance advice. Please see specific information pulled into the AVS if  appropriate.

## 2025-06-12 ENCOUNTER — RESULTS FOLLOW-UP (OUTPATIENT)
Dept: FAMILY MEDICINE CLINIC | Age: 45
End: 2025-06-12
Payer: COMMERCIAL

## 2025-06-12 NOTE — LETTER
Bradley Amaro  [address] [place] [address]    June 12, 2025     Dear Mr. Amaro:    Below are the results from your recent visit:    Resulted Orders   XR Spine Lumbar 2 or 3 View    Narrative    XR SPINE LUMBAR 2 OR 3 VW    Date of Exam: 6/11/2025 4:48 PM EDT    Indication: Chronic generalized low back pain radiating down the bilateral legs.    Comparison: Lumbar spine radiographs dated 7/31/2014    Findings:  There are 5 nonrib-bearing lumbar-type vertebral bodies. The vertebral body heights are maintained without evidence of fracture. There is severe disc height loss at L5-S1 with anterior endplate osteophyte formation. There is degenerative facet   arthropathy at L4-L5 and L5-S1.      Impression    Impression:  1.Severe degenerative disc height loss at L5-S1.  2.Degenerative facet arthropathy at L4-L5 and L5-S1.        Electronically Signed: Harlan Manning MD    6/11/2025 6:31 PM EDT    Workstation ID: [id number]       Arthritic change is noted and it is most severe at L5-S1 vertebrae. Please let me know if you are agreeable with a physical therapy evaluation. I do think that it may benefit you to have an MRI of the lumbar spine without contrast if symptoms do not significantly improve over the next 4 to 6 weeks.   If you have any questions or concerns, please don't hesitate to call.  Sincerely,    MONIQUE Nava

## 2025-06-12 NOTE — PROGRESS NOTES
Arthritic change is noted and it is most severe  at L5-S1 vertebrae.  Please let me know if you are agreeable with a physical therapy evaluation.  I do think that it may benefit you to have an MRI of the lumbar spine without contrast if symptoms do not significantly improve over the next 4 to 6 weeks.

## 2025-07-10 DIAGNOSIS — F41.8 ANXIETY WITH DEPRESSION: ICD-10-CM

## 2025-07-10 RX ORDER — SERTRALINE HYDROCHLORIDE 100 MG/1
100 TABLET, FILM COATED ORAL DAILY
Qty: 90 TABLET | Refills: 1 | Status: CANCELLED | OUTPATIENT
Start: 2025-07-10

## 2025-07-10 NOTE — TELEPHONE ENCOUNTER
Caller: Bradley Amaro    Relationship: Self    Best call back number: [phone]     Requested Prescriptions:   Requested Prescriptions     Pending Prescriptions Disp Refills    sertraline (Zoloft) 100 MG tablet 90 tablet 1     Sig: Take 1 tablet by mouth Daily.        Pharmacy where request should be sent: [id number] [place], [place] - [address] [facility] - [phone]  - [phone]     Last office visit with prescribing clinician: 6/11/2025   Last telemedicine visit with prescribing clinician: Visit date not found   Next office visit with prescribing clinician: 11/7/2025     Additional details provided by patient: PATIENT CALLED IN STATING HE HAS 2 PILLS LEFT OF THIS MEDICATION, PLEASE ADVISE     Does the patient have less than a 3 day supply:  [x] Yes  [] No      Lawrence Simeon Rep   07/10/25 11:58 EDT

## 2025-07-10 NOTE — TELEPHONE ENCOUNTER
LMOM for pt that this was sent in to [facility] on 5/10/25 for 90 x1 refill and he should contact [facility] and speak to a person

## 2025-07-14 DIAGNOSIS — I47.10 SVT (SUPRAVENTRICULAR TACHYCARDIA): ICD-10-CM

## 2025-07-14 RX ORDER — METOPROLOL SUCCINATE 25 MG/1
25 TABLET, EXTENDED RELEASE ORAL DAILY
Qty: 30 TABLET | Refills: 0 | Status: SHIPPED | OUTPATIENT
Start: 2025-07-14

## 2025-08-20 DIAGNOSIS — I47.10 SVT (SUPRAVENTRICULAR TACHYCARDIA): ICD-10-CM

## 2025-08-21 RX ORDER — METOPROLOL SUCCINATE 25 MG/1
25 TABLET, EXTENDED RELEASE ORAL DAILY
Qty: 90 TABLET | Refills: 0 | Status: SHIPPED | OUTPATIENT
Start: 2025-08-21